# Patient Record
Sex: MALE | URBAN - METROPOLITAN AREA
[De-identification: names, ages, dates, MRNs, and addresses within clinical notes are randomized per-mention and may not be internally consistent; named-entity substitution may affect disease eponyms.]

---

## 2017-08-16 ENCOUNTER — OFFICE VISIT (OUTPATIENT)
Dept: OPHTHALMOLOGY | Facility: CLINIC | Age: 57
End: 2017-08-16
Attending: OPHTHALMOLOGY
Payer: MEDICARE

## 2017-08-16 DIAGNOSIS — H35.52 RP (RETINITIS PIGMENTOSA): Primary | ICD-10-CM

## 2017-08-16 ASSESSMENT — SLIT LAMP EXAM - LIDS
COMMENTS: NORMAL
COMMENTS: NORMAL

## 2017-08-16 ASSESSMENT — VISUAL ACUITY
METHOD: SNELLEN - LINEAR
OD_SC: LP
OS_SC: LP

## 2017-08-16 ASSESSMENT — EXTERNAL EXAM - RIGHT EYE: OD_EXAM: NORMAL

## 2017-08-16 ASSESSMENT — TONOMETRY
OD_IOP_MMHG: 23
IOP_METHOD: TONOPEN
OS_IOP_MMHG: 19

## 2017-08-16 ASSESSMENT — EXTERNAL EXAM - LEFT EYE: OS_EXAM: NORMAL

## 2017-08-16 NOTE — LETTER
"8/16/2017       RE: Froylan Edwards  [address]   [facility] [address]     Dear Colleague,    Thank you for referring your patient, Froylan Edwards, to the EYE CLINIC at [facility]. Please see a copy of my visit note below.    CC -    ARGUS patient OS / RP  HPI -  No changes since ZOHRA.  Feels device helps a little, with reading & with navigation, but not enough to use routinely with ADLs.  Occasional discomfort, \"zaps\", not requiring meds.   Sees floater OD, moves around, bothersome. left eye stable .     POH  ARGUS II OS 4/28/15  Anterior chamber & post segment washout 5/715 for NCVH and hyphema OS   Status post  YAG attempt at vitreolysis right eye  8/2015    Gtts: Genteal gel (occasional)    RETINAL IMAGING  Optical Coherence Tomography 2.10.16  left eye: implant in good position, outer retina atrophic changes stable    ASSESSMENT & PLAN  1. ARGUS II OS    -  Pars plana vitrectomy (PPV)/Argus II 4/28/15   -  Anterior chamber & post segment washout/PPV left eye 5/7/15     -  retina attached, no infection, intraocular pressure good today.    - external components look good and no exposure   - implant in good position   - observe   - recheck 6 months    2.  History of chronic Pain left eye   - much better now    3. ERROL OS   - recommended using genteal 2-3/day to prevent erosion of conjunctiva    4. H/o VH OS   - resolved    4.  Advanced Retinitis pigmentosa    5.  Pseudophakia both eyes     6.  Floater OD   - given LP vision, surprising that it would be symptomatic   - patient's description is consistent with exam however   - failed attempted lysis 8/12/15    Chris Hernández MD  PGY3, Dept of Ophthalmology  Pager [phone]    ~~~~~~~~~~~~~~~~~~~~~~~~~~~~~~~~~~   Complete documentation of historical and exam elements from today's encounter can be found in the full encounter summary report (not reduplicated in this progress note).  I personally obtained the chief " complaint(s) and history of present illness.  I confirmed and edited as necessary the review of systems, past medical/surgical history, family history, social history, and examination findings as documented by others; and I examined the patient myself.  I personally reviewed the relevant tests, images, and reports as documented above.  I formulated and edited as necessary the assessment and plan and discussed the findings and management plan with the patient and family    Magdalene Adkins MD  .  Retina Service   Department of Ophthalmology and Visual Neurosciences   [facility]  Phone: [phone]   Fax: [phone]

## 2017-08-16 NOTE — MR AVS SNAPSHOT
After Visit Summary   2017    Froylan Edwards    MRN: [id number]           Patient Information     Date Of Birth          1960        Visit Information        Provider Department      2017 2:30 PM Magdalene Adkins MD Eye Clinic        Today's Diagnoses     RP (retinitis pigmentosa) - Both Eyes    -  1       Follow-ups after your visit        Who to contact     Please call your clinic at [phone] to:    Ask questions about your health    Make or cancel appointments    Discuss your medicines    Learn about your test results    Speak to your doctor   If you have compliments or concerns about an experience at your clinic, or if you wish to file a complaint, please contact [facility] Patient Relations at [phone] or email us at [email or web address]         Additional Information About Your Visit        MyChart Information     Anonymesst is an electronic gateway that provides easy, online access to your medical records. With [employer], you can request a clinic appointment, read your test results, renew a prescription or communicate with your care team.     To sign up for Anonymesst visit the website at [email or web address]   You will be asked to enter the access code listed below, as well as some personal information. Please follow the directions to create your username and password.     Your access code is: [email or web address]  Expires: 10/31/2017  6:31 AM     Your access code will  in 90 days. If you need help or a new code, please contact your [facility] or call [phone] for assistance.        Care EveryWhere ID     This is your Care EveryWhere ID. This could be used by other organizations to access your [place] medical records  [id number]         Blood Pressure from Last 3 Encounters:   08/12/15 152/60   05/07/15 123/77   05/01/15 142/87    Weight from Last 3 Encounters:   08/12/15 86.2 kg (190 lb)    04/28/15 95.3 kg (210 lb)              Today, you had the following     No orders found for display       Primary Care Provider    Physician No Ref-Primary       No address on file        Equal Access to Services     BULMARO ELIEUSEBIO : Hadii anshul manzano rama Valle, berna [place], woody kabenedicto mooney, [place]. So [facility] [phone].    ATENCIÓN: Si habla español, tiene a ambrocio disposición servicios gratuitos de asistencia lingüística. Llame al [phone].    We comply with applicable federal civil rights laws and [place] laws. We do not discriminate on the basis of race, color, national origin, age, disability sex, sexual orientation or gender identity.            Thank you!     Thank you for choosing EYE CLINIC  for your care. Our goal is always to provide you with excellent care. Hearing back from our patients is one way we can continue to improve our services. Please take a few minutes to complete the written survey that you may receive in the mail after your visit with us. Thank you!             Your Updated Medication List - Protect others around you: Learn how to safely use, store and throw away your medicines at www.disposemymeds.org.          This list is accurate as of: 8/16/17  5:49 PM.  Always use your most recent med list.                   Brand Name Dispense Instructions for use Diagnosis    aspirin 81 MG tablet      Take by mouth daily        atorvastatin 40 MG tablet    LIPITOR     Take 40 mg by mouth daily        BENTYL PO      Take 10 mg by mouth 4 times daily (before meals and nightly)        buPROPion 150 MG 24 hr tablet    WELLBUTRIN XL     Take 150 mg by mouth every morning        carboxymethylcellulose sodium 0.5 % Soln ophthalmic solution   Generic drug:  carboxymethylcellulose      1 drop as needed Rarely uses        clonazePAM 1 MG tablet    klonoPIN     Take 1 mg by mouth 2 times daily as needed        clopidogrel 75 MG tablet    PLAVIX     Take by  mouth daily        glucagon 1 MG kit      1 mg as needed for low blood sugar        * HYDROcodone-acetaminophen 5-325 MG per tablet    NORCO    20 tablet    Take 1 tablet by mouth every 8 hours as needed for moderate to severe pain    Aftercare following surgery of the sense organs, NEC       * HYDROcodone-acetaminophen 5-325 MG per tablet    NORCO    20 tablet    Take 1 tablet by mouth every 6 hours as needed for moderate to severe pain (Use one or two tablets as needed every 6 hour for pain)    Aftercare following surgery of the sense organs, NEC       * HYDROcodone-acetaminophen 5-325 MG per tablet    NORCO    20 tablet    Take 2 tablets by mouth every 6 hours as needed for moderate to severe pain (Use one or two tablets as needed every 4 hour for pain)    Superficial injury of cornea, right, sequela       LANTUS SC      Inject 80 Units Subcutaneous At Bedtime        levofloxacin 500 MG tablet    LEVAQUIN    4 tablet    Take 1 tablet (500 mg) by mouth daily    Pigmentary retinal dystrophy       losartan 100 MG tablet    COZAAR     Take 0.5 mg by mouth daily        melatonin 3 MG tablet      Take 2 tablets by mouth nightly as needed        metFORMIN 500 MG tablet    GLUCOPHAGE     Take 500 mg by mouth daily (with breakfast)        metoprolol 50 MG tablet    LOPRESSOR     Take 50 mg by mouth 2 times daily        Multi-vitamin Tabs tablet      Take 1 tablet by mouth daily        * NOVOLOG SC      Sliding scale        * NovoLOG FLEXPEN 100 UNIT/ML injection   Generic drug:  insulin aspart      Inject 21 Units Subcutaneous 3 times daily (with meals)        * NovoLOG FLEXPEN 100 UNIT/ML injection   Generic drug:  insulin aspart      Inject 10-12 Units Subcutaneous Take with snacks or supplements for high blood sugar        * oxyCODONE-acetaminophen 5-325 MG per tablet    PERCOCET    60 tablet    Take 1-2 tablets by mouth every 4 hours as needed for moderate to severe pain    Aftercare following surgery of the sense  organs, NEC       * oxyCODONE-acetaminophen 5-325 MG per tablet    PERCOCET    60 tablet    Take 1-2 tablets by mouth every 6 hours as needed for moderate to severe pain or pain    Aftercare following surgery of the sense organs, NEC       predniSONE 5 MG tablet    DELTASONE    15 tablet    Take 1 tablet (5 mg) by mouth daily    Aftercare following surgery of the sense organs, NEC       QUEtiapine 25 MG tablet    SEROquel    20 tablet    Take 0.5-1 tablets (12.5-25 mg) by mouth nightly as needed (for anxiety or sleeplessness)    Post-op pain       REGLAN PO      Take 5 mg by mouth        senna-docusate 8.6-50 MG per tablet    SENOKOT-S;PERICOLACE    30 tablet    Take 2 tablets by mouth 2 times daily    Post-op pain       testosterone cypionate 200 MG/ML injection    DEPOTESTOTERONE     Inject 50 mg into the muscle once a week        VITAMIN D3 PO      Take 8,000 Int'l Units by mouth daily        ZANAFLEX PO      Take 2 mg by mouth every 6 hours as needed for muscle spasms        * Notice:  This list has 8 medication(s) that are the same as other medications prescribed for you. Read the directions carefully, and ask your doctor or other care provider to review them with you.

## 2017-08-16 NOTE — PROGRESS NOTES
"CC -    ARGUS patient OS / RP  HPI -  No changes since ZOHRA.  Feels device helps a little, with reading & with navigation, but not enough to use routinely with ADLs.  Occasional discomfort, \"zaps\", not requiring meds.   Sees floater OD, moves around, bothersome. left eye stable .     POH  ARGUS II OS 4/28/15  Anterior chamber & post segment washout 5/715 for NCVH and hyphema OS   Status post  YAG attempt at vitreolysis right eye  8/2015    Gtts: Genteal gel (occasional)    RETINAL IMAGING  Optical Coherence Tomography 2.10.16  left eye: implant in good position, outer retina atrophic changes stable    ASSESSMENT & PLAN  1. ARGUS II OS    -  Pars plana vitrectomy (PPV)/Argus II 4/28/15   -  Anterior chamber & post segment washout/PPV left eye 5/7/15     -  retina attached, no infection, intraocular pressure good today.    - external components look good and no exposure   - implant in good position   - observe   - recheck 6 months    2.  History of chronic Pain left eye   - much better now    3. ERROL OS   - recommended using genteal 2-3/day to prevent erosion of conjunctiva    4. H/o VH OS   - resolved    4.  Advanced Retinitis pigmentosa    5.  Pseudophakia both eyes     6.  Floater OD   - given LP vision, surprising that it would be symptomatic   - patient's description is consistent with exam however   - failed attempted lysis 8/12/15    Chris Hernández MD  PGY3, Dept of Ophthalmology  Pager [phone]    ~~~~~~~~~~~~~~~~~~~~~~~~~~~~~~~~~~   Complete documentation of historical and exam elements from today's encounter can be found in the full encounter summary report (not reduplicated in this progress note).  I personally obtained the chief complaint(s) and history of present illness.  I confirmed and edited as necessary the review of systems, past medical/surgical history, family history, social history, and examination findings as documented by others; and I examined the patient myself.  I personally reviewed " the relevant tests, images, and reports as documented above.  I formulated and edited as necessary the assessment and plan and discussed the findings and management plan with the patient and family    Magdalene Adkins MD  .  Retina Service   Department of Ophthalmology and Visual Neurosciences   [facility]  Phone: [phone]   Fax: [phone]

## 2018-04-12 ENCOUNTER — OFFICE VISIT (OUTPATIENT)
Dept: OPHTHALMOLOGY | Facility: CLINIC | Age: 58
End: 2018-04-12
Attending: OPHTHALMOLOGY
Payer: MEDICARE

## 2018-04-12 DIAGNOSIS — Z48.810 AFTERCARE FOLLOWING SURGERY OF A SENSORY ORGAN: Primary | ICD-10-CM

## 2018-04-12 PROCEDURE — 40000269 ZZH STATISTIC NO CHARGE FACILITY FEE: Mod: ZF

## 2018-04-12 ASSESSMENT — VISUAL ACUITY
OS_SC: LP
OD_SC: LP
METHOD: SNELLEN - LINEAR

## 2018-04-12 ASSESSMENT — TONOMETRY
IOP_METHOD: PALPATION
OS_IOP_MMHG: 8
OD_IOP_MMHG: 13

## 2018-04-12 NOTE — NURSING NOTE
"Chief Complaints and History of Present Illnesses   Patient presents with     Research Study     ARGUS patient OS / RP (4/28/15)     HPI    Symptoms:     No blurred vision   No decreased vision   Floaters   Dryness         Do you have eye pain now?:  No      Comments:  ARGUS patient OS / RP (4/28/15)    No changes since LV.  Device still helps a little, with reading & with navigation, but not enough to use routinely with ADLs. No longer has the occasional discomfort 'zaps,' unsure when they had stopped but it was after LV.    Would like an update on the gls, was told there would be a wait b/c they are trying to get them approved through the FAA but it has been 10mo      [1] Floater OD, stable since LV  Had blood lab work done at , Vit D was low (15), hem sex hormone (11),   ERROL, slightly managed with genteal skyler PRN\"    Ocular meds:  Genteal skyler prn    Rosanna Rodas COT 8:38 AM April 12, 2018                  "

## 2018-04-12 NOTE — MR AVS SNAPSHOT
After Visit Summary   2018    Froylan Edwards    MRN: [id number]           Patient Information     Date Of Birth          1960        Visit Information        Provider Department      2018 8:00 AM [facility] EYE PHOTOGRAPHY Eye Clinic        Today's Diagnoses     Aftercare following surgery of a sensory organ    -  1       Follow-ups after your visit        Who to contact     Please call your clinic at [phone] to:    Ask questions about your health    Make or cancel appointments    Discuss your medicines    Learn about your test results    Speak to your doctor            Additional Information About Your Visit        MyChart Information     Kineta is an electronic gateway that provides easy, online access to your medical records. With Kineta, you can request a clinic appointment, read your test results, renew a prescription or communicate with your care team.     To sign up for [employer] visit the website at [email or web address]   You will be asked to enter the access code listed below, as well as some personal information. Please follow the directions to create your username and password.     Your access code is: [email or web address]  Expires: 2018 11:39 AM     Your access code will  in 90 days. If you need help or a new code, please contact your [facility] or call [phone] for assistance.        Care EveryWhere ID     This is your Care EveryWhere ID. This could be used by other organizations to access your [place] medical records  [id number]         Blood Pressure from Last 3 Encounters:   08/12/15 152/60   05/07/15 123/77   05/01/15 142/87    Weight from Last 3 Encounters:   08/12/15 86.2 kg (190 lb)   04/28/15 95.3 kg (210 lb)              Today, you had the following     No orders found for display       Primary Care Provider Fax #    Physician No Ref-Primary [phone]       No address on file        Equal Access to Services      BULMARO CLAIRE : Hadii aad ku rama Valle, waaxda luqadaha, qaybta kaalmada jesica, colt liliya haybenny beardelykelly dickson . So [facility] [phone].    ATENCIÓN: Si habla español, tiene a ambrocio disposición servicios gratuitos de asistencia lingüística. Llame al [phone].    We comply with applicable federal civil rights laws and [place] laws. We do not discriminate on the basis of race, color, national origin, age, disability, sex, sexual orientation, or gender identity.            Thank you!     Thank you for choosing EYE CLINIC  for your care. Our goal is always to provide you with excellent care. Hearing back from our patients is one way we can continue to improve our services. Please take a few minutes to complete the written survey that you may receive in the mail after your visit with us. Thank you!             Your Updated Medication List - Protect others around you: Learn how to safely use, store and throw away your medicines at www.disposemymeds.org.          This list is accurate as of 4/12/18 11:59 PM.  Always use your most recent med list.                   Brand Name Dispense Instructions for use Diagnosis    aspirin 81 MG tablet      Take by mouth daily        atorvastatin 40 MG tablet    LIPITOR     Take 40 mg by mouth daily        buPROPion 150 MG 24 hr tablet    WELLBUTRIN XL     Take 150 mg by mouth every morning        carboxymethylcellulose sodium 0.5 % Soln ophthalmic solution   Generic drug:  carboxymethylcellulose      1 drop as needed Rarely uses        clonazePAM 1 MG tablet    klonoPIN     Take 1 mg by mouth 2 times daily as needed        clopidogrel 75 MG tablet    PLAVIX     Take by mouth daily        glucagon 1 MG kit      1 mg as needed for low blood sugar        LANTUS SC      Inject 80 Units Subcutaneous At Bedtime        losartan 100 MG tablet    COZAAR     Take 0.5 mg by mouth daily        melatonin 3 MG tablet      Take 2 tablets by mouth nightly as needed        metFORMIN  500 MG tablet    GLUCOPHAGE     Take 500 mg by mouth daily (with breakfast)        metoprolol tartrate 50 MG tablet    LOPRESSOR     Take 50 mg by mouth 2 times daily        Multi-vitamin Tabs tablet      Take 1 tablet by mouth daily        * NOVOLOG SC      Sliding scale        * NovoLOG FLEXPEN 100 UNIT/ML injection   Generic drug:  insulin aspart      Inject 10-12 Units Subcutaneous Take with snacks or supplements for high blood sugar        QUEtiapine 25 MG tablet    SEROquel    20 tablet    Take 0.5-1 tablets (12.5-25 mg) by mouth nightly as needed (for anxiety or sleeplessness)    Post-op pain       testosterone cypionate 200 MG/ML injection    DEPOTESTOTERONE     Inject 50 mg into the muscle once a week        VITAMIN D3 PO      Take 8,000 Int'l Units by mouth daily        * Notice:  This list has 2 medication(s) that are the same as other medications prescribed for you. Read the directions carefully, and ask your doctor or other care provider to review them with you.

## 2018-09-05 ENCOUNTER — OFFICE VISIT (OUTPATIENT)
Dept: OPHTHALMOLOGY | Facility: CLINIC | Age: 58
End: 2018-09-05
Attending: OPHTHALMOLOGY
Payer: MEDICARE

## 2018-09-05 DIAGNOSIS — H35.52 RP (RETINITIS PIGMENTOSA): Primary | ICD-10-CM

## 2018-09-05 PROCEDURE — G0463 HOSPITAL OUTPT CLINIC VISIT: HCPCS | Mod: ZF

## 2018-09-05 PROCEDURE — 92134 CPTRZ OPH DX IMG PST SGM RTA: CPT | Mod: ZF | Performed by: OPHTHALMOLOGY

## 2018-09-05 PROCEDURE — 92250 FUNDUS PHOTOGRAPHY W/I&R: CPT | Mod: ZF | Performed by: OPHTHALMOLOGY

## 2018-09-05 ASSESSMENT — SLIT LAMP EXAM - LIDS
COMMENTS: NORMAL
COMMENTS: NORMAL

## 2018-09-05 ASSESSMENT — TONOMETRY
IOP_METHOD: ICARE
OD_IOP_MMHG: 10
OS_IOP_MMHG: 15

## 2018-09-05 ASSESSMENT — EXTERNAL EXAM - RIGHT EYE: OD_EXAM: NORMAL

## 2018-09-05 ASSESSMENT — EXTERNAL EXAM - LEFT EYE: OS_EXAM: NORMAL

## 2018-09-05 NOTE — PROGRESS NOTES
"CC -    ARGUS patient OS / RP  HPI -  No changes since ZOHRA.  Feels device helps a little, with reading & with navigation, but not enough to use routinely with ADLs.  Occasional discomfort, \"zaps\", not requiring meds.   Sees floater OD, moves around, bothersome. left eye stable .     POH  ARGUS II OS 4/28/15  Anterior chamber & post segment washout 5/715 for NCVH and hyphema OS   Status post  YAG attempt at vitreolysis right eye  8/2015    Gtts: Genteal gel (occasional)    RETINAL IMAGING  Optical Coherence Tomography 09/05/18   left eye: implant in good position, outer retina atrophic changes stable  Right eye: outer retina atrophic changes    Fundus pic consistent with exam 09/05/18     ASSESSMENT & PLAN  1. ARGUS II OS    -  Pars plana vitrectomy (PPV)/Argus II 4/28/15   -  Anterior chamber & post segment washout/PPV left eye 5/7/15   -  retina attached, no infection, intraocular pressure good.    - external components look good and no exposure   - implant in good position   - observe   - recheck 6 months  Patient using the device about 3 times per weeks for 2-3 hours    2.  History of chronic Pain left eye   - much better now    3. ERROL   - recommended using genteal 2-3/day     4.  Advanced Retinitis pigmentosa    5.  Pseudophakia both eyes     6.  Floater OD   - given his vision, surprising that it would be symptomatic   - patient's description is consistent with exam however   - failed attempted lysis 8/12/15   - patient reports it bothers him, states he sees the floater constantly and affects him the use of Argus II left ey,  thinking there is something there. Also bothers him even if he is not using the Argus II   - could consider vitrectomy 25 g Pars plana vitrectomy (PPV); AFx - peribulbar block 45 min surgery    ~~~~~~~~~~~~~~~~~~~~~~~~~~~~~~~~~~   Complete documentation of historical and exam elements from today's encounter can be found in the full encounter summary report (not reduplicated in this " progress note).  I personally obtained the chief complaint(s) and history of present illness.  I confirmed and edited as necessary the review of systems, past medical/surgical history, family history, social history, and examination findings as documented by others; and I examined the patient myself.  I personally reviewed the relevant tests, images, and reports as documented above.  I personally reviewed the ophthalmic test(s) associated with this encounter, agree with the interpretation(s) as documented by the resident/fellow, and have edited the corresponding report(s) as necessary.   I formulated and edited as necessary the assessment and plan and discussed the findings and management plan with the patient and family    Magdalene Adkins MD  .  Retina Service   Department of Ophthalmology and Visual Neurosciences   [facility]  Phone: [phone]   Fax: [phone]

## 2018-09-05 NOTE — MR AVS SNAPSHOT
After Visit Summary   2018    Froylan Edwards    MRN: [id number]           Patient Information     Date Of Birth          1960        Visit Information        Provider Department      2018 2:30 PM [facility]        Today's Diagnoses     RP (retinitis pigmentosa) - Both Eyes    -  1       Follow-ups after your visit        Follow-up notes from your care team     Return in about 6 months (around 3/5/2019).      Who to contact     Please call your clinic at [phone] to:    Ask questions about your health    Make or cancel appointments    Discuss your medicines    Learn about your test results    Speak to your doctor            Additional Information About Your Visit        MyChart Information     [employer] is an electronic gateway that provides easy, online access to your medical records. With [facility], you can request a clinic appointment, read your test results, renew a prescription or communicate with your care team.     To sign up for [employer] visit the website at [email or web address]   You will be asked to enter the access code listed below, as well as some personal information. Please follow the directions to create your username and password.     Your access code is: [email or web address]  Expires: 2018  6:48 PM     Your access code will  in 90 days. If you need help or a new code, please contact your [facility] or call [phone] for assistance.        Care EveryWhere ID     This is your Care EveryWhere ID. This could be used by other organizations to access your [facility] medical records  [id number]         Blood Pressure from Last 3 Encounters:   08/12/15 152/60   05/07/15 123/77   05/01/15 142/87    Weight from Last 3 Encounters:   08/12/15 86.2 kg (190 lb)   04/28/15 95.3 kg (210 lb)              We Performed the Following     Fundus Photos OU (both eyes)     OCT Retina Spectralis OU (both eyes)        Primary  Care Provider Fax #    Physician No Ref-Primary [phone]       No address on file        Equal Access to Services     BULMARO CLAIRE : Hadii anshul manzano rama Valle, wajoneda alban, woody kabenedicto mooney, colt gramajo laterriegrazyna hill. So [facility] [phone].    ATENCIÓN: Si habla español, tiene a ambrocio disposición servicios gratuitos de asistencia lingüística. Llame al [phone].    We comply with applicable federal civil rights laws and [place] laws. We do not discriminate on the basis of race, color, national origin, age, disability, sex, sexual orientation, or gender identity.            Thank you!     Thank you for choosing [facility]  for your care. Our goal is always to provide you with excellent care. Hearing back from our patients is one way we can continue to improve our services. Please take a few minutes to complete the written survey that you may receive in the mail after your visit with us. Thank you!             Your Updated Medication List - Protect others around you: Learn how to safely use, store and throw away your medicines at www.disposemymeds.org.          This list is accurate as of 9/5/18  6:48 PM.  Always use your most recent med list.                   Brand Name Dispense Instructions for use Diagnosis    aspirin 81 MG tablet      Take by mouth daily        atorvastatin 40 MG tablet    LIPITOR     Take 40 mg by mouth daily        buPROPion 150 MG 24 hr tablet    WELLBUTRIN XL     Take 150 mg by mouth every morning        carboxymethylcellulose sodium 0.5 % Soln ophthalmic solution   Generic drug:  carboxymethylcellulose      1 drop as needed Rarely uses        clonazePAM 1 MG tablet    klonoPIN     Take 1 mg by mouth 2 times daily as needed        clopidogrel 75 MG tablet    PLAVIX     Take by mouth daily        glucagon 1 MG kit      1 mg as needed for low blood sugar        LANTUS SC      Inject 80 Units Subcutaneous At Bedtime        losartan 100 MG tablet    COZAAR      Take 0.5 mg by mouth daily        melatonin 3 MG tablet      Take 2 tablets by mouth nightly as needed        metFORMIN 500 MG tablet    GLUCOPHAGE     Take 500 mg by mouth daily (with breakfast)        metoprolol tartrate 50 MG tablet    LOPRESSOR     Take 50 mg by mouth 2 times daily        Multi-vitamin Tabs tablet      Take 1 tablet by mouth daily        * NOVOLOG SC      Sliding scale        * NovoLOG FLEXPEN 100 UNIT/ML injection   Generic drug:  insulin aspart      Inject 10-12 Units Subcutaneous Take with snacks or supplements for high blood sugar        QUEtiapine 25 MG tablet    SEROquel    20 tablet    Take 0.5-1 tablets (12.5-25 mg) by mouth nightly as needed (for anxiety or sleeplessness)    Post-op pain       testosterone cypionate 200 MG/ML injection    DEPOTESTOTERONE     Inject 50 mg into the muscle once a week        VITAMIN D3 PO      Take 8,000 Int'l Units by mouth daily        * Notice:  This list has 2 medication(s) that are the same as other medications prescribed for you. Read the directions carefully, and ask your doctor or other care provider to review them with you.

## 2018-09-06 DIAGNOSIS — H43.391 VITREOUS FLOATERS OF RIGHT EYE: Primary | ICD-10-CM

## 2018-09-17 ENCOUNTER — ANESTHESIA EVENT (OUTPATIENT)
Dept: SURGERY | Facility: AMBULATORY SURGERY CENTER | Age: 58
End: 2018-09-17

## 2018-09-17 ENCOUNTER — TRANSFERRED RECORDS (OUTPATIENT)
Dept: HEALTH INFORMATION MANAGEMENT | Facility: CLINIC | Age: 58
End: 2018-09-17

## 2018-09-18 ENCOUNTER — SURGERY (OUTPATIENT)
Age: 58
End: 2018-09-18

## 2018-09-18 ENCOUNTER — HOSPITAL ENCOUNTER (OUTPATIENT)
Facility: AMBULATORY SURGERY CENTER | Age: 58
End: 2018-09-18
Attending: OPHTHALMOLOGY
Payer: MEDICARE

## 2018-09-18 ENCOUNTER — ANESTHESIA (OUTPATIENT)
Dept: SURGERY | Facility: AMBULATORY SURGERY CENTER | Age: 58
End: 2018-09-18

## 2018-09-18 VITALS
OXYGEN SATURATION: 96 % | DIASTOLIC BLOOD PRESSURE: 81 MMHG | TEMPERATURE: 97.3 F | SYSTOLIC BLOOD PRESSURE: 133 MMHG | RESPIRATION RATE: 16 BRPM

## 2018-09-18 DIAGNOSIS — G89.18 POST-OP PAIN: Primary | ICD-10-CM

## 2018-09-18 LAB
GLUCOSE BLDC GLUCOMTR-MCNC: 251 MG/DL (ref 70–99)
GLUCOSE BLDC GLUCOMTR-MCNC: 267 MG/DL (ref 70–99)

## 2018-09-18 RX ORDER — TROPICAMIDE 10 MG/ML
1 SOLUTION/ DROPS OPHTHALMIC
Status: COMPLETED | OUTPATIENT
Start: 2018-09-18 | End: 2018-09-18

## 2018-09-18 RX ORDER — ACETAMINOPHEN 325 MG/1
975 TABLET ORAL ONCE
Status: COMPLETED | OUTPATIENT
Start: 2018-09-18 | End: 2018-09-18

## 2018-09-18 RX ORDER — ONDANSETRON 4 MG/1
4 TABLET, ORALLY DISINTEGRATING ORAL EVERY 30 MIN PRN
Status: DISCONTINUED | OUTPATIENT
Start: 2018-09-18 | End: 2018-09-20 | Stop reason: HOSPADM

## 2018-09-18 RX ORDER — LIDOCAINE HYDROCHLORIDE 20 MG/ML
INJECTION, SOLUTION INFILTRATION; PERINEURAL PRN
Status: DISCONTINUED | OUTPATIENT
Start: 2018-09-18 | End: 2018-09-18

## 2018-09-18 RX ORDER — DICYCLOMINE HYDROCHLORIDE 10 MG/1
CAPSULE ORAL
COMMUNITY
Start: 2018-01-31

## 2018-09-18 RX ORDER — FENTANYL CITRATE 50 UG/ML
25-50 INJECTION, SOLUTION INTRAMUSCULAR; INTRAVENOUS
Status: DISCONTINUED | OUTPATIENT
Start: 2018-09-18 | End: 2018-09-18 | Stop reason: HOSPADM

## 2018-09-18 RX ORDER — CLONAZEPAM 0.5 MG/1
0.5 TABLET ORAL
COMMUNITY
End: 2018-10-24

## 2018-09-18 RX ORDER — HYDROMORPHONE HYDROCHLORIDE 1 MG/ML
.3-.5 INJECTION, SOLUTION INTRAMUSCULAR; INTRAVENOUS; SUBCUTANEOUS EVERY 10 MIN PRN
Status: DISCONTINUED | OUTPATIENT
Start: 2018-09-18 | End: 2018-09-20 | Stop reason: HOSPADM

## 2018-09-18 RX ORDER — ONDANSETRON 2 MG/ML
INJECTION INTRAMUSCULAR; INTRAVENOUS PRN
Status: DISCONTINUED | OUTPATIENT
Start: 2018-09-18 | End: 2018-09-18

## 2018-09-18 RX ORDER — FENTANYL CITRATE 50 UG/ML
INJECTION, SOLUTION INTRAMUSCULAR; INTRAVENOUS PRN
Status: DISCONTINUED | OUTPATIENT
Start: 2018-09-18 | End: 2018-09-18

## 2018-09-18 RX ORDER — SODIUM CHLORIDE, SODIUM LACTATE, POTASSIUM CHLORIDE, CALCIUM CHLORIDE 600; 310; 30; 20 MG/100ML; MG/100ML; MG/100ML; MG/100ML
INJECTION, SOLUTION INTRAVENOUS CONTINUOUS
Status: DISCONTINUED | OUTPATIENT
Start: 2018-09-18 | End: 2018-09-18 | Stop reason: HOSPADM

## 2018-09-18 RX ORDER — OXYCODONE HYDROCHLORIDE 5 MG/1
5-10 TABLET ORAL EVERY 4 HOURS PRN
Status: DISCONTINUED | OUTPATIENT
Start: 2018-09-18 | End: 2018-09-20 | Stop reason: HOSPADM

## 2018-09-18 RX ORDER — ALBUTEROL SULFATE 0.83 MG/ML
2.5 SOLUTION RESPIRATORY (INHALATION) EVERY 4 HOURS PRN
Status: DISCONTINUED | OUTPATIENT
Start: 2018-09-18 | End: 2018-09-18 | Stop reason: HOSPADM

## 2018-09-18 RX ORDER — LEVOCETIRIZINE DIHYDROCHLORIDE 5 MG/1
5 TABLET, FILM COATED ORAL
COMMUNITY
Start: 2018-06-11

## 2018-09-18 RX ORDER — GABAPENTIN 300 MG/1
300 CAPSULE ORAL ONCE
Status: COMPLETED | OUTPATIENT
Start: 2018-09-18 | End: 2018-09-18

## 2018-09-18 RX ORDER — BALANCED SALT SOLUTION 6.4; .75; .48; .3; 3.9; 1.7 MG/ML; MG/ML; MG/ML; MG/ML; MG/ML; MG/ML
SOLUTION OPHTHALMIC PRN
Status: DISCONTINUED | OUTPATIENT
Start: 2018-09-18 | End: 2018-09-18 | Stop reason: HOSPADM

## 2018-09-18 RX ORDER — TRAZODONE HYDROCHLORIDE 50 MG/1
TABLET, FILM COATED ORAL
COMMUNITY
Start: 2018-05-31

## 2018-09-18 RX ORDER — PHENYLEPHRINE HYDROCHLORIDE 25 MG/ML
1 SOLUTION/ DROPS OPHTHALMIC
Status: COMPLETED | OUTPATIENT
Start: 2018-09-18 | End: 2018-09-18

## 2018-09-18 RX ORDER — KETOROLAC TROMETHAMINE 30 MG/ML
30 INJECTION, SOLUTION INTRAMUSCULAR; INTRAVENOUS EVERY 6 HOURS PRN
Status: DISCONTINUED | OUTPATIENT
Start: 2018-09-18 | End: 2018-09-20 | Stop reason: HOSPADM

## 2018-09-18 RX ORDER — NEOMYCIN POLYMYXIN B SULFATES AND DEXAMETHASONE 3.5; 10000; 1 MG/ML; [USP'U]/ML; MG/ML
1 SUSPENSION/ DROPS OPHTHALMIC 4 TIMES DAILY
Qty: 5 ML | Refills: 0 | Status: SHIPPED | OUTPATIENT
Start: 2018-09-18 | End: 2018-10-24

## 2018-09-18 RX ORDER — FENTANYL CITRATE 50 UG/ML
25-50 INJECTION, SOLUTION INTRAMUSCULAR; INTRAVENOUS
Status: DISCONTINUED | OUTPATIENT
Start: 2018-09-18 | End: 2018-09-20 | Stop reason: HOSPADM

## 2018-09-18 RX ORDER — PROPOFOL 10 MG/ML
INJECTION, EMULSION INTRAVENOUS PRN
Status: DISCONTINUED | OUTPATIENT
Start: 2018-09-18 | End: 2018-09-18

## 2018-09-18 RX ORDER — ATROPINE SULFATE 10 MG/ML
SOLUTION/ DROPS OPHTHALMIC PRN
Status: DISCONTINUED | OUTPATIENT
Start: 2018-09-18 | End: 2018-09-18 | Stop reason: HOSPADM

## 2018-09-18 RX ORDER — CYCLOPENTOLATE HYDROCHLORIDE 10 MG/ML
1 SOLUTION/ DROPS OPHTHALMIC
Status: COMPLETED | OUTPATIENT
Start: 2018-09-18 | End: 2018-09-18

## 2018-09-18 RX ORDER — DEXAMETHASONE SODIUM PHOSPHATE 4 MG/ML
INJECTION, SOLUTION INTRA-ARTICULAR; INTRALESIONAL; INTRAMUSCULAR; INTRAVENOUS; SOFT TISSUE PRN
Status: DISCONTINUED | OUTPATIENT
Start: 2018-09-18 | End: 2018-09-18 | Stop reason: HOSPADM

## 2018-09-18 RX ORDER — SODIUM CHLORIDE, SODIUM LACTATE, POTASSIUM CHLORIDE, CALCIUM CHLORIDE 600; 310; 30; 20 MG/100ML; MG/100ML; MG/100ML; MG/100ML
INJECTION, SOLUTION INTRAVENOUS CONTINUOUS
Status: DISCONTINUED | OUTPATIENT
Start: 2018-09-18 | End: 2018-09-20 | Stop reason: HOSPADM

## 2018-09-18 RX ORDER — LIDOCAINE 40 MG/G
CREAM TOPICAL
Status: DISCONTINUED | OUTPATIENT
Start: 2018-09-18 | End: 2018-09-18 | Stop reason: HOSPADM

## 2018-09-18 RX ORDER — BUSPIRONE HYDROCHLORIDE 10 MG/1
10 TABLET ORAL
COMMUNITY
Start: 2018-09-17

## 2018-09-18 RX ORDER — DEXAMETHASONE SODIUM PHOSPHATE 4 MG/ML
4 INJECTION, SOLUTION INTRA-ARTICULAR; INTRALESIONAL; INTRAMUSCULAR; INTRAVENOUS; SOFT TISSUE EVERY 10 MIN PRN
Status: DISCONTINUED | OUTPATIENT
Start: 2018-09-18 | End: 2018-09-20 | Stop reason: HOSPADM

## 2018-09-18 RX ORDER — EZETIMIBE 10 MG/1
10 TABLET ORAL
COMMUNITY
Start: 2018-09-17 | End: 2019-09-17

## 2018-09-18 RX ORDER — ONDANSETRON 2 MG/ML
4 INJECTION INTRAMUSCULAR; INTRAVENOUS EVERY 30 MIN PRN
Status: DISCONTINUED | OUTPATIENT
Start: 2018-09-18 | End: 2018-09-20 | Stop reason: HOSPADM

## 2018-09-18 RX ORDER — MEPERIDINE HYDROCHLORIDE 25 MG/ML
12.5 INJECTION INTRAMUSCULAR; INTRAVENOUS; SUBCUTANEOUS
Status: DISCONTINUED | OUTPATIENT
Start: 2018-09-18 | End: 2018-09-20 | Stop reason: HOSPADM

## 2018-09-18 RX ORDER — NALOXONE HYDROCHLORIDE 0.4 MG/ML
.1-.4 INJECTION, SOLUTION INTRAMUSCULAR; INTRAVENOUS; SUBCUTANEOUS
Status: ACTIVE | OUTPATIENT
Start: 2018-09-18 | End: 2018-09-19

## 2018-09-18 RX ADMIN — FENTANYL CITRATE 50 MCG: 50 INJECTION, SOLUTION INTRAMUSCULAR; INTRAVENOUS at 11:07

## 2018-09-18 RX ADMIN — GABAPENTIN 300 MG: 300 CAPSULE ORAL at 10:14

## 2018-09-18 RX ADMIN — PROPOFOL 100 MG: 10 INJECTION, EMULSION INTRAVENOUS at 11:09

## 2018-09-18 RX ADMIN — ACETAMINOPHEN 975 MG: 325 TABLET ORAL at 10:14

## 2018-09-18 RX ADMIN — CYCLOPENTOLATE HYDROCHLORIDE 1 DROP: 10 SOLUTION/ DROPS OPHTHALMIC at 10:12

## 2018-09-18 RX ADMIN — PHENYLEPHRINE HYDROCHLORIDE 1 DROP: 25 SOLUTION/ DROPS OPHTHALMIC at 10:12

## 2018-09-18 RX ADMIN — ONDANSETRON 4 MG: 2 INJECTION INTRAMUSCULAR; INTRAVENOUS at 11:09

## 2018-09-18 RX ADMIN — Medication 0.5 ML: at 11:29

## 2018-09-18 RX ADMIN — TROPICAMIDE 1 DROP: 10 SOLUTION/ DROPS OPHTHALMIC at 09:57

## 2018-09-18 RX ADMIN — PHENYLEPHRINE HYDROCHLORIDE 1 DROP: 25 SOLUTION/ DROPS OPHTHALMIC at 09:57

## 2018-09-18 RX ADMIN — SODIUM CHLORIDE, SODIUM LACTATE, POTASSIUM CHLORIDE, CALCIUM CHLORIDE: 600; 310; 30; 20 INJECTION, SOLUTION INTRAVENOUS at 10:15

## 2018-09-18 RX ADMIN — DEXAMETHASONE SODIUM PHOSPHATE 4 MG: 4 INJECTION, SOLUTION INTRA-ARTICULAR; INTRALESIONAL; INTRAMUSCULAR; INTRAVENOUS; SOFT TISSUE at 11:07

## 2018-09-18 RX ADMIN — Medication 500 ML: at 11:25

## 2018-09-18 RX ADMIN — Medication 0.5 ML: at 11:27

## 2018-09-18 RX ADMIN — DEXAMETHASONE SODIUM PHOSPHATE 0.5 ML: 4 INJECTION, SOLUTION INTRA-ARTICULAR; INTRALESIONAL; INTRAMUSCULAR; INTRAVENOUS; SOFT TISSUE at 11:27

## 2018-09-18 RX ADMIN — LIDOCAINE HYDROCHLORIDE 60 MG: 20 INJECTION, SOLUTION INFILTRATION; PERINEURAL at 11:08

## 2018-09-18 RX ADMIN — PHENYLEPHRINE HYDROCHLORIDE 1 DROP: 25 SOLUTION/ DROPS OPHTHALMIC at 10:07

## 2018-09-18 RX ADMIN — ATROPINE SULFATE 1 DROP: 10 SOLUTION/ DROPS OPHTHALMIC at 11:27

## 2018-09-18 RX ADMIN — CYCLOPENTOLATE HYDROCHLORIDE 1 DROP: 10 SOLUTION/ DROPS OPHTHALMIC at 10:07

## 2018-09-18 RX ADMIN — TROPICAMIDE 1 DROP: 10 SOLUTION/ DROPS OPHTHALMIC at 10:07

## 2018-09-18 RX ADMIN — TROPICAMIDE 1 DROP: 10 SOLUTION/ DROPS OPHTHALMIC at 10:12

## 2018-09-18 RX ADMIN — FENTANYL CITRATE 50 MCG: 50 INJECTION, SOLUTION INTRAMUSCULAR; INTRAVENOUS at 11:17

## 2018-09-18 RX ADMIN — BALANCED SALT SOLUTION 15 ML: 6.4; .75; .48; .3; 3.9; 1.7 SOLUTION OPHTHALMIC at 11:25

## 2018-09-18 RX ADMIN — CYCLOPENTOLATE HYDROCHLORIDE 1 DROP: 10 SOLUTION/ DROPS OPHTHALMIC at 09:57

## 2018-09-18 ASSESSMENT — LIFESTYLE VARIABLES: TOBACCO_USE: 1

## 2018-09-18 NOTE — ANESTHESIA POSTPROCEDURE EVALUATION
Patient: Froylan Edwards    Procedure(s):  Right Eye 25 Vitrectomy, Fluid Gas Exchange - Wound Class: I-Clean    Diagnosis:Vitreous Floaters  Diagnosis Additional Information: No value filed.    Anesthesia Type:  MAC    Note:  Anesthesia Post Evaluation    Patient location during evaluation: PACU  Patient participation: Able to fully participate in evaluation  Level of consciousness: awake and alert  Pain management: adequate  Airway patency: patent  Cardiovascular status: acceptable  Respiratory status: acceptable  Hydration status: acceptable  PONV: none             Last vitals:  Vitals:    09/18/18 1016 09/18/18 1150   BP: (!) 146/94 133/81   Resp: 16 16   Temp: 36.7  C (98.1  F) 36.3  C (97.3  F)   SpO2: 97% 96%         Electronically Signed By: Eitan Cain MD  September 18, 2018  12:38 PM

## 2018-09-18 NOTE — ANESTHESIA PREPROCEDURE EVALUATION
Anesthesia Evaluation     . Pt has had prior anesthetic. Type: General    No history of anesthetic complications          ROS/MED HX    ENT/Pulmonary:     (+)sleep apnea, other ENT- TMD dysfunction, tobacco use, Past use , . .    Neurologic:     (+)other neuro bells palsy    Cardiovascular:     (+) hypertension--CAD, --. : . . . :. .       METS/Exercise Tolerance:     Hematologic:         Musculoskeletal:         GI/Hepatic:         Renal/Genitourinary:  - ROS Renal section negative       Endo:     (+) type I DM, .      Psychiatric:     (+) psychiatric history anxiety and depression      Infectious Disease:  - neg infectious disease ROS       Malignancy:      - no malignancy   Other:    (+) H/O Chronic Pain,                   Physical Exam  Normal systems: pulmonary    Airway   Mallampati: III  TM distance: >3 FB  Neck ROM: full    Dental   (+) missing and chipped    Cardiovascular   Rhythm and rate: regular and normal      Pulmonary                     Anesthesia Plan      History & Physical Review  History and physical reviewed and following examination; no interval change.    ASA Status:  3 .    NPO Status:  > 8 hours    Plan for MAC with Intravenous induction. Maintenance will be Balanced.    PONV prophylaxis:  Ondansetron (or other 5HT-3) and Dexamethasone or Solumedrol       Postoperative Care  Postoperative pain management:  Multi-modal analgesia.      Consents  Anesthetic plan, risks, benefits and alternatives discussed with:  Patient..                          .

## 2018-09-18 NOTE — ANESTHESIA CARE TRANSFER NOTE
Patient: Froylan Edwards    Procedure(s):  Right Eye 25 Vitrectomy, Fluid Gas Exchange - Wound Class: I-Clean    Diagnosis: Vitreous Floaters  Diagnosis Additional Information: No value filed.    Anesthesia Type:   MAC     Note:  Airway :Room Air  Patient transferred to:Phase II  Handoff Report: Identifed the Patient, Identified the Reponsible Provider, Reviewed the pertinent medical history, Discussed the surgical course, Reviewed Intra-OP anesthesia mangement and issues during anesthesia, Set expectations for post-procedure period and Allowed opportunity for questions and acknowledgement of understanding      Vitals: (Last set prior to Anesthesia Care Transfer)    CRNA VITALS  9/18/2018 1117 - 9/18/2018 1152      9/18/2018             Pulse: 90    SpO2: 96 %    Resp Rate (set): 10                Electronically Signed By: ADONAY Mcmahan CRNA  September 18, 2018  11:52 AM

## 2018-09-18 NOTE — IP AVS SNAPSHOT
[facility]    [address] [place] [id number]    Phone:  [phone]    Fax:  [phone]                                       After Visit Summary   9/18/2018    Froylan Edwards    MRN: [id number]           After Visit Summary Signature Page     I have received my discharge instructions, and my questions have been answered. I have discussed any challenges I see with this plan with the nurse or doctor.    ..........................................................................................................................................  Patient/Patient Representative Signature      ..........................................................................................................................................  Patient Representative Print Name and Relationship to Patient    ..................................................               ................................................  Date                                   Time    ..........................................................................................................................................  Reviewed by Signature/Title    ...................................................              ..............................................  Date                                               Time          22EPIC Rev 08/18

## 2018-09-18 NOTE — OP NOTE
Surgeon:    Magdalene Adkins M.D  Assistant surgeon:       Shawn Mcfarlane MD  Pre-operative diagnosis:  Vitreous floaters right eye  Post-operative diagnosis: Same  Surgery:     RIGHT 25 gauge vitectomy  , fluid-air exchange  Complications:   none  Anesthesia:    Monitored anesthesia care and peribulbar block right eye   Blood loss:    Scant  Complications :   None    INDICATIONS:   Froylan Edwards is a 58 year old patient with diagnosis of vitreous floaters right eye, here for surgical repair.     DESCRIPTION OF THE PROCEDURE   The patient was brought into the OR where anesthesia was given by the anesthesia department. A peribulbar block consistent of a 1:1 mixtures of 2% Lidocaine and 0.75 % of marcaine with epinephrine and wydase was administered. The eye was prepped and draped in the usual fashion for ophthalmic surgery, including the installation of one drop of povidone iodine.    Marks were made on the sclera inferotemporally, superotemporally, and superonasally 3.5 mm posterior to the limbus. Transscleral cannulas were inserted through the sclera using the trocars. The infusion cannula was connected inferotemporally and directly visualized to verify it was in the correct location. The vitrector handpiece and endoilluiminator were placed in the eye. Upon entering the eye it was noted that the patient had vitreous floaters.     A pars plana vitrectomy was completed. Kenalog was placed in the eye and peripheral vitrectomy was performed. The peripheral retina was examined with scleral depression no retinal breaks or tears were noted a partial air fluid exchange was performed. The instruments were removed. The sclerotomies were closed using 6-0 plain sutures.  The eye pressure was inspected and was appropriate. Subconjunctival injections of ancef and dexamethasone were administered.  The lid speculum was removed. The eye was cleaned with wet and dry gauze. A drop of atropine and maxitrol ointment was placed in  the eye. An eye patch and wall shield were taped over the eye.   The patient tolerated well the procedure and was discharge to the post-operative unit in stable conditions with no complications.    The surgery was assisted by Dr. Shawn Mcfarlane, because no qualified resident was available on the day of the surgery. Due to the delicate and complex nature of this surgery, Dr. Shawn Mcfarlane was required. Dr. Shawn Mcfarlane assisted with vitrectomy. I was present for the entire surgery.

## 2018-09-18 NOTE — DISCHARGE INSTRUCTIONS
[facility]  Home Care Following Anesthesia  For 24 hours after surgery:  1. Get plenty of rest.  A responsible adult must stay with you for at least 24 hours after you leave the surgery center.  2. Do not drive or use heavy equipment.  If you have weakness or tingling, don't drive or use heavy equipment until this feeling goes away.   3. Do not drink alcohol.   4. Avoid strenuous or risky activities.  Ask for help when climbing stairs.  5. You may feel lightheaded.  IF so, sit for a few minutes before standing.  Have someone help you get up.   6. If you have nausea (feel sick to your stomach): Drink only clear liquids such as apple juice, ginger ale, broth or 7-Up.  Rest may also help.  Be sure to drink enough fluids.  Move to a regular diet as you feel able.   7. You may have a slight fever.  Call the doctor if your fever is over 100 F (37.7 C) (taken under the tongue) or lasts longer than 24 hours.  8. You may have a dry mouth, a sore throat, muscle aches or trouble sleeping. These should go away after 24 hours.  9. Do not make important or legal decisions.               Tips for taking pain medications  To get the best pain relief possible, remember these points:    Take pain medications as directed, before pain becomes severe.    Pain medication can upset your stomach: taking it with food may help.    Constipation is a common side effect of pain medication. Drink plenty of  fluids.    Eat foods high in fiber. Take a stool softener if recommended by your doctor or pharmacist.    Do not drink alcohol, drive or operate machinery while taking pain medications.    Ask about other ways to control pain, such as with heat, ice or relaxation.    Tylenol/Acetaminophen Consumption  To help encourage the safe use of acetaminophen, the makers of TYLENOL  have lowered the maximum daily dose for single-ingredient Extra Strength TYLENOL  (acetaminophen) products sold in the [place] from 8  pills per day (4,000 mg) to 6 pills per day (3,000 mg). The dosing interval has also changed from 2 pills every 4-6 hours to 2 pills every 6 hours.    If you feel your pain relief is insufficient, you may take Tylenol/Acetaminophen in addition to your narcotic pain medication.     Be careful not to exceed 3,000 mg of Tylenol/Acetaminophen in a 24 hour period from all sources.    If you are taking extra strength Tylenol/acetaminophen (500 mg), the maximum dose is 6 tablets in 24 hours.    If you are taking regular strength acetaminophen (325 mg), the maximum dose is 9 tablets in 24 hours.    Call a doctor for any of the followin. Signs of infection (fever, growing tenderness at the surgery site, a large amount of drainage or bleeding, severe pain, foul-smelling drainage, redness, swelling).  2. It has been over 8 to 10 hours since surgery and you are still not able to urinate (pass water).  3. Headache for over 24 hours.  4. Numbness, tingling or weakness the day after surgery (if you had spinal anesthesia).  Your doctor is:       Dr. Magdalene Adkins, Ophthalmology: [phone]               Or dial [phone] and ask for the resident on call for:  Ophthalmology  For emergency care, call the:  [facility]:  [phone] (TTY for hearing impaired: [phone])

## 2018-09-18 NOTE — IP AVS SNAPSHOT
MRN:[id number]                      After Visit Summary   9/18/2018    Froylan Edwards    MRN: [id number]           Thank you!     Thank you for choosing [place] for your care. Our goal is always to provide you with excellent care. Hearing back from our patients is one way we can continue to improve our services. Please take a few minutes to complete the written survey that you may receive in the mail after you visit with us. Thank you!        Patient Information     Date Of Birth          1960        About your hospital stay     You were admitted on:  September 18, 2018 You last received care in the:  [facility]    You were discharged on:  September 18, 2018       Who to Call     For medical emergencies, please call 911.  For non-urgent questions about your medical care, please call your primary care provider or clinic, None  For questions related to your surgery, please call your surgery clinic        Attending Provider     Provider Specialty    Magdalene Adkins MD Ophthalmology       Primary Care Provider Fax #    Physician No Ref-Primary [phone]      After Care Instructions     Activity       Avoid strenuous activities the next several days.                  Your next 10 appointments already scheduled     Sep 19, 2018 12:45 PM CDT   Post-Op with Magdalene Adkins MD   Eye Clinic ([facility])    [place] [address] [address]   [phone]            Sep 26, 2018  1:45 PM CDT   Post-Op with Magdalene Adkins MD   Eye Clinic ([facility])    [address] [address]   [phone]              Further instructions from your care team       [facility]  Home Care Following Anesthesia  For 24 hours after surgery:  1. Get plenty of rest.  A responsible adult must stay  with you for at least 24 hours after you leave the surgery center.  2. Do not drive or use heavy equipment.  If you have weakness or tingling, don't drive or use heavy equipment until this feeling goes away.   3. Do not drink alcohol.   4. Avoid strenuous or risky activities.  Ask for help when climbing stairs.  5. You may feel lightheaded.  IF so, sit for a few minutes before standing.  Have someone help you get up.   6. If you have nausea (feel sick to your stomach): Drink only clear liquids such as apple juice, ginger ale, broth or 7-Up.  Rest may also help.  Be sure to drink enough fluids.  Move to a regular diet as you feel able.   7. You may have a slight fever.  Call the doctor if your fever is over 100 F (37.7 C) (taken under the tongue) or lasts longer than 24 hours.  8. You may have a dry mouth, a sore throat, muscle aches or trouble sleeping. These should go away after 24 hours.  9. Do not make important or legal decisions.               Tips for taking pain medications  To get the best pain relief possible, remember these points:    Take pain medications as directed, before pain becomes severe.    Pain medication can upset your stomach: taking it with food may help.    Constipation is a common side effect of pain medication. Drink plenty of  fluids.    Eat foods high in fiber. Take a stool softener if recommended by your doctor or pharmacist.    Do not drink alcohol, drive or operate machinery while taking pain medications.    Ask about other ways to control pain, such as with heat, ice or relaxation.    Tylenol/Acetaminophen Consumption  To help encourage the safe use of acetaminophen, the makers of TYLENOL  have lowered the maximum daily dose for single-ingredient Extra Strength TYLENOL  (acetaminophen) products sold in the [place] from 8 pills per day (4,000 mg) to 6 pills per day (3,000 mg). The dosing interval has also changed from 2 pills every 4-6 hours to 2 pills every 6 hours.    If you feel your  pain relief is insufficient, you may take Tylenol/Acetaminophen in addition to your narcotic pain medication.     Be careful not to exceed 3,000 mg of Tylenol/Acetaminophen in a 24 hour period from all sources.    If you are taking extra strength Tylenol/acetaminophen (500 mg), the maximum dose is 6 tablets in 24 hours.    If you are taking regular strength acetaminophen (325 mg), the maximum dose is 9 tablets in 24 hours.    Call a doctor for any of the followin. Signs of infection (fever, growing tenderness at the surgery site, a large amount of drainage or bleeding, severe pain, foul-smelling drainage, redness, swelling).  2. It has been over 8 to 10 hours since surgery and you are still not able to urinate (pass water).  3. Headache for over 24 hours.  4. Numbness, tingling or weakness the day after surgery (if you had spinal anesthesia).  Your doctor is:       Dr. Magdalene Adkins, Ophthalmology: [phone]               Or dial [phone] and ask for the resident on call for:  Ophthalmology  For emergency care, call the:  [facility]:  [phone] (TTY for hearing impaired: [phone])                Pending Results     No orders found from 2018 to 2018.            Admission Information     Date & Time Provider Department Dept. Phone    2018 Magdalene Adkins MD [facility] [phone]      Your Vitals Were     Blood Pressure Temperature Respirations Pulse Oximetry          146/94 98.1  F (36.7  C) (Oral) 16 97%        Omega[employer] Information     [employer] is an electronic gateway that provides easy, online access to your medical records. With Omega[employer], you can request a clinic appointment, read your test results, renew a prescription or communicate with your care team.     To sign up for Omega[employer] visit the website at [email or web address]   You will be asked to enter the access code listed below, as well as some personal  information. Please follow the directions to create your username and password.     Your access code is: [email or web address]  Expires: 2018  6:48 PM     Your access code will  in 90 days. If you need help or a new code, please contact your [facility] or call [phone] for assistance.        Care EveryWhere ID     This is your Care EveryWhere ID. This could be used by other organizations to access your [place] medical records  [id number]        Equal Access to Services     BULMARO CLAIRE : Hadii aad ku hadasho Soomaali, waaxda luqadaha, qaybta kaalmada adeegyada, waxay carolain haydelphinen arnaud dickson . So [facility] [phone].    ATENCIÓN: Si habla español, tiene a ambrocio disposición servicios gratuitos de asistencia lingüística. Llame al [phone].    We comply with applicable federal civil rights laws and [place] laws. We do not discriminate on the basis of race, color, national origin, age, disability, sex, sexual orientation, or gender identity.               Review of your medicines      UNREVIEWED medicines. Ask your doctor about these medicines        Dose / Directions    busPIRone 10 MG tablet   Commonly known as:  BUSPAR        Dose:  10 mg   Take 10 mg by mouth   Refills:  0       * clonazePAM 1 MG tablet   Commonly known as:  klonoPIN   Ask about: Which instructions should I use?        Dose:  1 mg   Take 1 mg by mouth 2 times daily as needed   Refills:  0       * clonazePAM 0.5 MG tablet   Commonly known as:  klonoPIN   Ask about: Which instructions should I use?        Dose:  0.5 mg   Take 0.5 mg by mouth   Refills:  0       dicyclomine 10 MG capsule   Commonly known as:  BENTYL        take 1 capsule by mouth three times a day with meals as needed   Refills:  0       ezetimibe 10 MG tablet   Commonly known as:  ZETIA        Dose:  10 mg   Take 10 mg by mouth   Refills:  0       insulin regular 100 UNIT/ML injection   Commonly known as:  HumuLIN R/NovoLIN R         Dose:  20 Units   Inject 20 Units Subcutaneous   Refills:  0       levocetirizine 5 MG tablet   Commonly known as:  XYZAL        Dose:  5 mg   Take 5 mg by mouth   Refills:  0       traZODone 50 MG tablet   Commonly known as:  DESYREL        0.5 tab twice a day as scheduled and 0.5 tab as needed daily for anxiety. 1-2 tab at bed time.   Refills:  0       * Notice:  This list has 2 medication(s) that are the same as other medications prescribed for you. Read the directions carefully, and ask your doctor or other care provider to review them with you.      START taking        Dose / Directions    neomycin-polymixin-dexamethasone ophthalmic suspension   Commonly known as:  MAXITROL   Used for:  Post-op pain        Dose:  1 drop   Apply 1 drop to eye 4 times daily Instill into operative eye(s) per physician instructions.   Quantity:  5 mL   Refills:  0         CONTINUE these medicines which have NOT CHANGED        Dose / Directions    aspirin 81 MG tablet        Take by mouth daily   Refills:  0       atorvastatin 40 MG tablet   Commonly known as:  LIPITOR        Dose:  40 mg   Take 40 mg by mouth daily   Refills:  0       buPROPion 150 MG 24 hr tablet   Commonly known as:  WELLBUTRIN XL        Dose:  150 mg   Take 150 mg by mouth every morning   Refills:  0       carboxymethylcellulose sodium 0.5 % Soln ophthalmic solution   Generic drug:  carboxymethylcellulose        Dose:  1 drop   1 drop as needed Rarely uses   Refills:  0       clopidogrel 75 MG tablet   Commonly known as:  PLAVIX        Take by mouth daily   Refills:  0       glucagon 1 MG kit        Dose:  1 mg   1 mg as needed for low blood sugar   Refills:  0       LANTUS SC        Dose:  80 Units   Inject 80 Units Subcutaneous At Bedtime   Refills:  0       losartan 100 MG tablet   Commonly known as:  COZAAR        Dose:  0.5 mg   Take 0.5 mg by mouth daily   Refills:  0       melatonin 3 MG tablet        Dose:  2 tablet   Take 2 tablets by mouth nightly as  needed   Refills:  0       metFORMIN 500 MG tablet   Commonly known as:  GLUCOPHAGE        Dose:  500 mg   Take 500 mg by mouth daily (with breakfast)   Refills:  0       Multi-vitamin Tabs tablet        Dose:  1 tablet   Take 1 tablet by mouth daily   Refills:  0       * NOVOLOG SC        Sliding scale   Refills:  0       * NovoLOG FLEXPEN 100 UNIT/ML injection   Generic drug:  insulin aspart        Dose:  10-12 Units   Inject 10-12 Units Subcutaneous Take with snacks or supplements for high blood sugar   Refills:  0       QUEtiapine 25 MG tablet   Commonly known as:  SEROquel   Used for:  Post-op pain        Dose:  12.5-25 mg   Take 0.5-1 tablets (12.5-25 mg) by mouth nightly as needed (for anxiety or sleeplessness)   Quantity:  20 tablet   Refills:  0       testosterone cypionate 200 MG/ML injection   Commonly known as:  DEPOTESTOTERONE        Dose:  50 mg   Inject 50 mg into the muscle once a week   Refills:  0       VITAMIN D3 PO        Dose:  8000 Int'l Units   Take 8,000 Int'l Units by mouth daily   Refills:  0       * Notice:  This list has 2 medication(s) that are the same as other medications prescribed for you. Read the directions carefully, and ask your doctor or other care provider to review them with you.      STOP taking     metoprolol tartrate 50 MG tablet   Commonly known as:  LOPRESSOR                Where to get your medicines      These medications were sent to [facility] [address] [address]    Hours:  TRANSPLANT PHONE NUMBER [phone] Phone:  [phone]     neomycin-polymixin-dexamethasone ophthalmic suspension                Protect others around you: Learn how to safely use, store and throw away your medicines at www.disposemymeds.org.             Medication List: This is a list of all your medications and when to take them. Check marks below indicate your daily home schedule. Keep this  list as a reference.      Medications           Morning Afternoon Evening Bedtime As Needed    aspirin 81 MG tablet   Take by mouth daily                                atorvastatin 40 MG tablet   Commonly known as:  LIPITOR   Take 40 mg by mouth daily                                buPROPion 150 MG 24 hr tablet   Commonly known as:  WELLBUTRIN XL   Take 150 mg by mouth every morning                                busPIRone 10 MG tablet   Commonly known as:  BUSPAR   Take 10 mg by mouth                                carboxymethylcellulose sodium 0.5 % Soln ophthalmic solution   1 drop as needed Rarely uses   Generic drug:  carboxymethylcellulose                                clopidogrel 75 MG tablet   Commonly known as:  PLAVIX   Take by mouth daily                                dicyclomine 10 MG capsule   Commonly known as:  BENTYL   take 1 capsule by mouth three times a day with meals as needed                                ezetimibe 10 MG tablet   Commonly known as:  ZETIA   Take 10 mg by mouth                                glucagon 1 MG kit   1 mg as needed for low blood sugar                                insulin regular 100 UNIT/ML injection   Commonly known as:  HumuLIN R/NovoLIN R   Inject 20 Units Subcutaneous                                LANTUS SC   Inject 80 Units Subcutaneous At Bedtime                                levocetirizine 5 MG tablet   Commonly known as:  XYZAL   Take 5 mg by mouth                                losartan 100 MG tablet   Commonly known as:  COZAAR   Take 0.5 mg by mouth daily                                melatonin 3 MG tablet   Take 2 tablets by mouth nightly as needed                                metFORMIN 500 MG tablet   Commonly known as:  GLUCOPHAGE   Take 500 mg by mouth daily (with breakfast)                                Multi-vitamin Tabs tablet   Take 1 tablet by mouth daily                                neomycin-polymixin-dexamethasone ophthalmic  suspension   Commonly known as:  MAXITROL   Apply 1 drop to eye 4 times daily Instill into operative eye(s) per physician instructions.                                * NOVOLOG SC   Sliding scale                                * NovoLOG FLEXPEN 100 UNIT/ML injection   Inject 10-12 Units Subcutaneous Take with snacks or supplements for high blood sugar   Generic drug:  insulin aspart                                QUEtiapine 25 MG tablet   Commonly known as:  SEROquel   Take 0.5-1 tablets (12.5-25 mg) by mouth nightly as needed (for anxiety or sleeplessness)                                testosterone cypionate 200 MG/ML injection   Commonly known as:  DEPOTESTOTERONE   Inject 50 mg into the muscle once a week                                traZODone 50 MG tablet   Commonly known as:  DESYREL   0.5 tab twice a day as scheduled and 0.5 tab as needed daily for anxiety. 1-2 tab at bed time.                                VITAMIN D3 PO   Take 8,000 Int'l Units by mouth daily                                * Notice:  This list has 2 medication(s) that are the same as other medications prescribed for you. Read the directions carefully, and ask your doctor or other care provider to review them with you.      ASK your doctor about these medications           Morning Afternoon Evening Bedtime As Needed    * clonazePAM 1 MG tablet   Commonly known as:  klonoPIN   Take 1 mg by mouth 2 times daily as needed   Ask about: Which instructions should I use?                                * clonazePAM 0.5 MG tablet   Commonly known as:  klonoPIN   Take 0.5 mg by mouth   Ask about: Which instructions should I use?                                * Notice:  This list has 2 medication(s) that are the same as other medications prescribed for you. Read the directions carefully, and ask your doctor or other care provider to review them with you.

## 2018-09-19 ENCOUNTER — OFFICE VISIT (OUTPATIENT)
Dept: OPHTHALMOLOGY | Facility: CLINIC | Age: 58
End: 2018-09-19
Attending: OPHTHALMOLOGY
Payer: MEDICARE

## 2018-09-19 DIAGNOSIS — Z98.890 POSTOPERATIVE EYE STATE: Primary | ICD-10-CM

## 2018-09-19 PROCEDURE — G0463 HOSPITAL OUTPT CLINIC VISIT: HCPCS | Mod: ZF

## 2018-09-19 RX ORDER — DORZOLAMIDE HYDROCHLORIDE AND TIMOLOL MALEATE 20; 5 MG/ML; MG/ML
1 SOLUTION/ DROPS OPHTHALMIC 2 TIMES DAILY
Qty: 1 ML | Refills: 0 | Status: SHIPPED | OUTPATIENT
Start: 2018-09-19 | End: 2018-09-21

## 2018-09-19 RX ORDER — ATROPINE SULFATE 10 MG/ML
1 SOLUTION/ DROPS OPHTHALMIC 2 TIMES DAILY
COMMUNITY
End: 2018-10-24

## 2018-09-19 ASSESSMENT — CONF VISUAL FIELD
OD_INFERIOR_NASAL_RESTRICTION: 1
OS_INFERIOR_TEMPORAL_RESTRICTION: 1
OS_INFERIOR_NASAL_RESTRICTION: 1
OS_SUPERIOR_NASAL_RESTRICTION: 1
OS_SUPERIOR_TEMPORAL_RESTRICTION: 1
OD_INFERIOR_TEMPORAL_RESTRICTION: 1
OD_SUPERIOR_TEMPORAL_RESTRICTION: 1
METHOD: COUNTING FINGERS
OD_SUPERIOR_NASAL_RESTRICTION: 1

## 2018-09-19 ASSESSMENT — VISUAL ACUITY
METHOD: SNELLEN - LINEAR
OS_SC: LP WITH PROJECTION
OD_SC: LP WITH PROJECTION

## 2018-09-19 ASSESSMENT — TONOMETRY
IOP_METHOD: TONOPEN
OS_IOP_MMHG: 27
OD_IOP_MMHG: 31

## 2018-09-19 NOTE — MR AVS SNAPSHOT
After Visit Summary   2018    Froylan Edwards    MRN: [id number]           Patient Information     Date Of Birth          1960        Visit Information        Provider Department      2018 12:45 PM Magdalene Adkins MD Eye Clinic        Today's Diagnoses     Postoperative eye state    -  1       Follow-ups after your visit        Follow-up notes from your care team     Return in about 1 week (around 2018).      Your next 10 appointments already scheduled     Sep 26, 2018  1:45 PM CDT   Post-Op with Magdalene Adkins MD   Eye Clinic ([facility])    [address]  9[facility] [address] [place] [id number] [phone]              Who to contact     Please call your clinic at [phone] to:    Ask questions about your health    Make or cancel appointments    Discuss your medicines    Learn about your test results    Speak to your doctor            Additional Information About Your Visit        MyChart Information     [employer] is an electronic gateway that provides easy, online access to your medical records. With [employer], you can request a clinic appointment, read your test results, renew a prescription or communicate with your care team.     To sign up for [employer] visit the website at [email or web address]   You will be asked to enter the access code listed below, as well as some personal information. Please follow the directions to create your username and password.     Your access code is: [email or web address]  Expires: 2018  6:48 PM     Your access code will  in 90 days. If you need help or a new code, please contact your [facility] or call [phone] for assistance.        Care EveryWhere ID     This is your Care EveryWhere ID. This could be used by other organizations to access your [place] medical records  [id number]         Blood Pressure from Last 3 Encounters:   18  133/81   08/12/15 152/60   05/07/15 123/77    Weight from Last 3 Encounters:   08/12/15 86.2 kg (190 lb)   04/28/15 95.3 kg (210 lb)              Today, you had the following     No orders found for display         Today's Medication Changes          These changes are accurate as of 9/19/18 11:59 PM.  If you have any questions, ask your nurse or doctor.               Start taking these medicines.        Dose/Directions    dorzolamide-timolol 2-0.5 % ophthalmic solution   Commonly known as:  COSOPT   Used for:  Postoperative eye state        Dose:  1 drop   Place 1 drop into the right eye 2 times daily for 7 days   Quantity:  1 mL   Refills:  0            Where to get your medicines      These medications were sent to [facility] [address] [place] [address]     Phone:  [phone]     dorzolamide-timolol 2-0.5 % ophthalmic solution                Primary Care Provider Fax #    Physician No Ref-Primary [phone]       No address on file        Equal Access to Services     [facility]: Hadii anshul ontiveros Sosevero, waaxda luqadaha, qaybta kaalmada adeveronica, colt dickson . So [facility] [phone].    ATENCIÓN: Si habla español, tiene a ambrocio disposición servicios gratuitos de asistencia lingüística. Llame al [phone].    We comply with applicable federal civil rights laws and [place] laws. We do not discriminate on the basis of race, color, national origin, age, disability, sex, sexual orientation, or gender identity.            Thank you!     Thank you for choosing [facility] CLINIC  for your care. Our goal is always to provide you with excellent care. Hearing back from our patients is one way we can continue to improve our services. Please take a few minutes to complete the written survey that you may receive in the mail after your visit with us. Thank you!             Your Updated Medication List - Protect others around you:  Learn how to safely use, store and throw away your medicines at www.disposemymeds.org.          This list is accurate as of 9/19/18 11:59 PM.  Always use your most recent med list.                   Brand Name Dispense Instructions for use Diagnosis    aspirin 81 MG tablet      Take by mouth daily        atorvastatin 40 MG tablet    LIPITOR     Take 40 mg by mouth daily        atropine 1 % ophthalmic solution      Place 1 drop into the right eye 2 times daily        buPROPion 150 MG 24 hr tablet    WELLBUTRIN XL     Take 150 mg by mouth every morning        busPIRone 10 MG tablet    BUSPAR     Take 10 mg by mouth        carboxymethylcellulose sodium 0.5 % Soln ophthalmic solution   Generic drug:  carboxymethylcellulose      1 drop as needed Rarely uses        * clonazePAM 1 MG tablet    klonoPIN     Take 1 mg by mouth 2 times daily as needed        * clonazePAM 0.5 MG tablet    klonoPIN     Take 0.5 mg by mouth        clopidogrel 75 MG tablet    PLAVIX     Take by mouth daily        dicyclomine 10 MG capsule    BENTYL     take 1 capsule by mouth three times a day with meals as needed        dorzolamide-timolol 2-0.5 % ophthalmic solution    COSOPT    1 mL    Place 1 drop into the right eye 2 times daily for 7 days    Postoperative eye state       ezetimibe 10 MG tablet    ZETIA     Take 10 mg by mouth        glucagon 1 MG kit      1 mg as needed for low blood sugar        insulin regular 100 UNIT/ML injection    HumuLIN R/NovoLIN R     Inject 20 Units Subcutaneous        LANTUS SC      Inject 80 Units Subcutaneous At Bedtime        levocetirizine 5 MG tablet    XYZAL     Take 5 mg by mouth        losartan 100 MG tablet    COZAAR     Take 0.5 mg by mouth daily        melatonin 3 MG tablet      Take 2 tablets by mouth nightly as needed        metFORMIN 500 MG tablet    GLUCOPHAGE     Take 500 mg by mouth daily (with breakfast)        Multi-vitamin Tabs tablet      Take 1 tablet by mouth daily         neomycin-polymixin-dexamethasone ophthalmic suspension    MAXITROL    5 mL    Apply 1 drop to eye 4 times daily Instill into operative eye(s) per physician instructions.    Post-op pain       * NOVOLOG SC      Sliding scale        * NovoLOG FLEXPEN 100 UNIT/ML injection   Generic drug:  insulin aspart      Inject 10-12 Units Subcutaneous Take with snacks or supplements for high blood sugar        QUEtiapine 25 MG tablet    SEROquel    20 tablet    Take 0.5-1 tablets (12.5-25 mg) by mouth nightly as needed (for anxiety or sleeplessness)    Post-op pain       testosterone cypionate 200 MG/ML injection    DEPOTESTOTERONE     Inject 50 mg into the muscle once a week        traZODone 50 MG tablet    DESYREL     0.5 tab twice a day as scheduled and 0.5 tab as needed daily for anxiety. 1-2 tab at bed time.        VITAMIN D3 PO      Take 8,000 Int'l Units by mouth daily        * Notice:  This list has 4 medication(s) that are the same as other medications prescribed for you. Read the directions carefully, and ask your doctor or other care provider to review them with you.

## 2018-09-19 NOTE — PROGRESS NOTES
"CC -    Postoperative day 1 s/p vitrectomy right eye for floater    Pain and some discomfort over night.    HPI -   ARGUS patient OS / Retinitis pigmentosa. Feels device helps a little, with reading & with navigation, but not enough to use routinely with ADLs.  Occasional discomfort, \"zaps\", not requiring meds.   Sees floater OD, moves around, bothersome. left eye stable .     POH  ARGUS II OS 4/28/15  Anterior chamber & post segment washout 5/715 for NCVH and hyphema OS   Status post  YAG attempt at vitreolysis right eye  8/2015    Gtts: Genteal gel (occasional)    RETINAL IMAGING  Optical Coherence Tomography 09/05/18   left eye: implant in good position, outer retina atrophic changes stable  Right eye: outer retina atrophic changes    Fundus pic consistent with exam 09/05/18     ASSESSMENT & PLAN    0. Postoperative day 1 s/p Pars plana vitrectomy (PPV) right eye   - start post operative drops   - IOP elevated - will start Cosopt twice a day for one week    - Follow up in one week    1. ARGUS II OS    -  Pars plana vitrectomy (PPV)/Argus II 4/28/15   -  Anterior chamber & post segment washout/PPV left eye 5/7/15   -  retina attached, no infection, intraocular pressure good.    - external components look good and no exposure   - implant in good position   - observe   - recheck 6 months  Patient using the device about 3 times per weeks for 2-3 hours    2.  History of chronic Pain left eye   - much better now    3. ERROL   - recommended using genteal 2-3/day     4.  Advanced Retinitis pigmentosa    5.  Pseudophakia both eyes     6.  Floater OD   - given his vision, surprising that it would be symptomatic   - patient's description is consistent with exam however   - failed attempted lysis 8/12/15   - patient reports it bothers him, states he sees the floater constantly and affects him the use of Argus II left ey,  thinking there is something there. Also bothers him even if he is not using the Argus II   - s/p 25 g Pars " plana vitrectomy (PPV); AFx - peribulbar block 45 min surgery      Medications to the operative eye:  Maxitrol drops four times a day OD  Maxitrol ointment at bedtime OD  Cosopt twice a day OD    Eye shield at bedtime  No heavy lifting, no strenuous activity  Follow up in one week  Plan for OCTA both eyes     Rocky Krishna MD  PGY-3 Ophthalmology      ~~~~~~~~~~~~~~~~~~~~~~~~~~~~~~~~~~   Complete documentation of historical and exam elements from today's encounter can be found in the full encounter summary report (not reduplicated in this progress note).  I personally obtained the chief complaint(s) and history of present illness.  I confirmed and edited as necessary the review of systems, past medical/surgical history, family history, social history, and examination findings as documented by others; and I examined the patient myself.  I personally reviewed the relevant tests, images, and reports as documented above.  I personally reviewed the ophthalmic test(s) associated with this encounter, agree with the interpretation(s) as documented by the resident/fellow, and have edited the corresponding report(s) as necessary.   I formulated and edited as necessary the assessment and plan and discussed the findings and management plan with the patient and family    Magdalene Adkins MD  .  Retina Service   Department of Ophthalmology and Visual Neurosciences   [facility]  Phone: [phone]   Fax: [phone]

## 2018-09-19 NOTE — NURSING NOTE
Chief Complaints and History of Present Illnesses   Patient presents with     Post Op (Ophthalmology) Right Eye      1 day after vitrectomy     HPI    Last Eye Exam:  9/5/18   Affected eye(s):  Right   Symptoms:        Duration:  1 day   Frequency:  Constant          Comments:  Froylan is here 1 day post op after vitrectomy RE. He says he did not sleep well last night. He says his RE is itchy constantly since the surgery.     Jimmy Dave COT 12:37 PM September 19, 2018

## 2018-09-20 ENCOUNTER — TELEPHONE (OUTPATIENT)
Dept: OPHTHALMOLOGY | Facility: CLINIC | Age: 58
End: 2018-09-20

## 2018-09-20 ASSESSMENT — SLIT LAMP EXAM - LIDS
COMMENTS: NORMAL
COMMENTS: NORMAL

## 2018-09-20 ASSESSMENT — EXTERNAL EXAM - RIGHT EYE: OD_EXAM: NORMAL

## 2018-09-20 ASSESSMENT — EXTERNAL EXAM - LEFT EYE: OS_EXAM: NORMAL

## 2018-09-21 ENCOUNTER — TELEPHONE (OUTPATIENT)
Dept: OPHTHALMOLOGY | Facility: CLINIC | Age: 58
End: 2018-09-21

## 2018-09-21 DIAGNOSIS — Z98.890 POSTOPERATIVE EYE STATE: ICD-10-CM

## 2018-09-21 RX ORDER — DORZOLAMIDE HYDROCHLORIDE AND TIMOLOL MALEATE 20; 5 MG/ML; MG/ML
1 SOLUTION/ DROPS OPHTHALMIC 2 TIMES DAILY
Qty: 1 ML | Refills: 0 | Status: SHIPPED | OUTPATIENT
Start: 2018-09-21 | End: 2018-10-24

## 2018-09-21 NOTE — TELEPHONE ENCOUNTER
[facility] on [facility] has cosopt eye drop  Rx sent there for pt pickup  Pt aware  Jonathan Osorio RN 3:00 PM 09/21/18

## 2018-09-21 NOTE — TELEPHONE ENCOUNTER
[facility] Call Center    Phone Message    May a detailed message be left on voicemail: yes    Reason for Call: Other: Pt called back urgently requesting an alternative to the drops he was most recently prescribed. Pt has not had the medication, is getting frustrated because he wants to follow the DrMike's orders. Pt requests communication because he would like to know if it's not important to take these drops then he won't worry. Pt requests a call asap.      Action Taken: Message routed to:  [facility] ([facility] Eye

## 2018-09-21 NOTE — TELEPHONE ENCOUNTER
[facility] Call Center    Phone Message    May a detailed message be left on voicemail: yes    Reason for Call: PT had surgery on Tuesday and has been unable to get the RX dorzolamide-timolol (COSOPT) 2-0.5 % ophthalmic solution because it's not available at the pharmacy. Pharmacy needs an alternative RX to be sent over to them at the [facility] [address] [place] [address]. PT needs it sent ASAP since they have been unable to use it post surgery. Thanks.       Action Taken: Message routed to:  [facility] ([facility] EYE

## 2018-09-27 ENCOUNTER — OFFICE VISIT (OUTPATIENT)
Dept: OPHTHALMOLOGY | Facility: CLINIC | Age: 58
End: 2018-09-27
Attending: OPHTHALMOLOGY
Payer: MEDICARE

## 2018-09-27 DIAGNOSIS — Z48.810 AFTERCARE FOLLOWING SURGERY OF A SENSORY ORGAN: ICD-10-CM

## 2018-09-27 DIAGNOSIS — H35.52 RP (RETINITIS PIGMENTOSA): Primary | ICD-10-CM

## 2018-09-27 PROCEDURE — G0463 HOSPITAL OUTPT CLINIC VISIT: HCPCS | Mod: ZF

## 2018-09-27 ASSESSMENT — VISUAL ACUITY
OD_SC: LP
METHOD: SNELLEN - LINEAR

## 2018-09-27 ASSESSMENT — CONF VISUAL FIELD
OS_INFERIOR_NASAL_RESTRICTION: 1
OS_INFERIOR_TEMPORAL_RESTRICTION: 1
OS_SUPERIOR_TEMPORAL_RESTRICTION: 1
OS_SUPERIOR_NASAL_RESTRICTION: 1
OD_SUPERIOR_TEMPORAL_RESTRICTION: 1
OD_INFERIOR_NASAL_RESTRICTION: 1
OD_INFERIOR_TEMPORAL_RESTRICTION: 1
OD_SUPERIOR_NASAL_RESTRICTION: 1

## 2018-09-27 ASSESSMENT — EXTERNAL EXAM - LEFT EYE: OS_EXAM: NORMAL

## 2018-09-27 ASSESSMENT — TONOMETRY
OS_IOP_MMHG: 06
IOP_METHOD: TONOPEN
OD_IOP_MMHG: 05

## 2018-09-27 ASSESSMENT — EXTERNAL EXAM - RIGHT EYE: OD_EXAM: NORMAL

## 2018-09-27 ASSESSMENT — SLIT LAMP EXAM - LIDS
COMMENTS: NORMAL
COMMENTS: NORMAL

## 2018-09-27 NOTE — PATIENT INSTRUCTIONS
- Maxitrol three times a day x 1 week, then twice a day x 1 ween, then once a day x 1 ween and stop   - Follow up in one month  - wall shield at night

## 2018-09-27 NOTE — MR AVS SNAPSHOT
After Visit Summary   2018    Froylan Edwards    MRN: [id number]           Patient Information     Date Of Birth          1960        Visit Information        Provider Department      2018 1:00 PM Magdalene Adkins MD [facility]        Today's Diagnoses     RP (retinitis pigmentosa)    -  1      Care Instructions    - Maxitrol three times a day x 1 week, then twice a day x 1 ween, then once a day x 1 ween and stop   - Follow up in one month  - wall shield at night           Follow-ups after your visit        Your next 10 appointments already scheduled     Oct 24, 2018  1:45 PM CDT   Post-Op with Magdalene Adkins MD   Eye Clinic ([facility])    [address] [id number] [phone]              Who to contact     Please call your clinic at [phone] to:    Ask questions about your health    Make or cancel appointments    Discuss your medicines    Learn about your test results    Speak to your doctor            Additional Information About Your Visit        [employer] Information     [employer] is an electronic gateway that provides easy, online access to your medical records. With [employer], you can request a clinic appointment, read your test results, renew a prescription or communicate with your care team.     To sign up for [employer] visit the website at [email or web address]   You will be asked to enter the access code listed below, as well as some personal information. Please follow the directions to create your username and password.     Your access code is: [email or web address]  Expires: 2018  6:48 PM     Your access code will  in 90 days. If you need help or a new code, please contact your [facility] or call [phone] for assistance.        Care EveryWhere ID     This is your Care EveryWhere ID. This could be used by other organizations to access your  [facility] medical records  [id number]         Blood Pressure from Last 3 Encounters:   09/18/18 133/81   08/12/15 152/60   05/07/15 123/77    Weight from Last 3 Encounters:   08/12/15 86.2 kg (190 lb)   04/28/15 95.3 kg (210 lb)              We Performed the Following     OCT Retina Spectralis OU (both eyes)        Primary Care Provider Fax #    Physician No Ref-Primary [phone]       No address on file        Equal Access to Services     BULMARO CLAIRE : Hadii aad ku hadasho Soomaali, waaxda luqadaha, qaybta kaalmada adeegyada, waxay idiin hayaan adeariadne hill. So [facility] [phone].    ATENCIÓN: Si habla español, tiene a ambrocio disposición servicios gratuitos de asistencia lingüística. [facility] [phone].    We comply with applicable federal civil rights laws and [place] laws. We do not discriminate on the basis of race, color, national origin, age, disability, sex, sexual orientation, or gender identity.            Thank you!     Thank you for choosing EYE CLINIC  for your care. Our goal is always to provide you with excellent care. Hearing back from our patients is one way we can continue to improve our services. Please take a few minutes to complete the written survey that you may receive in the mail after your visit with us. Thank you!             Your Updated Medication List - Protect others around you: Learn how to safely use, store and throw away your medicines at www.disposemymeds.org.          This list is accurate as of 9/27/18  2:19 PM.  Always use your most recent med list.                   Brand Name Dispense Instructions for use Diagnosis    aspirin 81 MG tablet      Take by mouth daily        atorvastatin 40 MG tablet    LIPITOR     Take 40 mg by mouth daily        atropine 1 % ophthalmic solution      Place 1 drop into the right eye 2 times daily        buPROPion 150 MG 24 hr tablet    WELLBUTRIN XL     Take 150 mg by mouth every morning        busPIRone 10 MG tablet    BUSPAR     Take  10 mg by mouth        carboxymethylcellulose sodium 0.5 % Soln ophthalmic solution   Generic drug:  carboxymethylcellulose      1 drop as needed Rarely uses        * clonazePAM 1 MG tablet    klonoPIN     Take 1 mg by mouth 2 times daily as needed        * clonazePAM 0.5 MG tablet    klonoPIN     Take 0.5 mg by mouth        clopidogrel 75 MG tablet    PLAVIX     Take by mouth daily        dicyclomine 10 MG capsule    BENTYL     take 1 capsule by mouth three times a day with meals as needed        dorzolamide-timolol 2-0.5 % ophthalmic solution    COSOPT    1 mL    Place 1 drop into the right eye 2 times daily    Postoperative eye state       ezetimibe 10 MG tablet    ZETIA     Take 10 mg by mouth        glucagon 1 MG kit      1 mg as needed for low blood sugar        insulin regular 100 UNIT/ML injection    HumuLIN R/NovoLIN R     Inject 20 Units Subcutaneous        LANTUS SC      Inject 80 Units Subcutaneous At Bedtime        levocetirizine 5 MG tablet    XYZAL     Take 5 mg by mouth        losartan 100 MG tablet    COZAAR     Take 0.5 mg by mouth daily        melatonin 3 MG tablet      Take 2 tablets by mouth nightly as needed        metFORMIN 500 MG tablet    GLUCOPHAGE     Take 500 mg by mouth daily (with breakfast)        Multi-vitamin Tabs tablet      Take 1 tablet by mouth daily        neomycin-polymixin-dexamethasone ophthalmic suspension    MAXITROL    5 mL    Apply 1 drop to eye 4 times daily Instill into operative eye(s) per physician instructions.    Post-op pain       * NOVOLOG SC      Sliding scale        * NovoLOG FLEXPEN 100 UNIT/ML injection   Generic drug:  insulin aspart      Inject 10-12 Units Subcutaneous Take with snacks or supplements for high blood sugar        QUEtiapine 25 MG tablet    SEROquel    20 tablet    Take 0.5-1 tablets (12.5-25 mg) by mouth nightly as needed (for anxiety or sleeplessness)    Post-op pain       testosterone cypionate 200 MG/ML injection    DEPOTESTOTERONE      Inject 50 mg into the muscle once a week        traZODone 50 MG tablet    DESYREL     0.5 tab twice a day as scheduled and 0.5 tab as needed daily for anxiety. 1-2 tab at bed time.        VITAMIN D3 PO      Take 8,000 Int'l Units by mouth daily        * Notice:  This list has 4 medication(s) that are the same as other medications prescribed for you. Read the directions carefully, and ask your doctor or other care provider to review them with you.

## 2018-09-27 NOTE — PROGRESS NOTES
"CC -    Postoperative day 9 s/p vitrectomy right eye for floater    Pain and some discomfort over night.    HPI -   ARGUS patient OS / Retinitis pigmentosa. Feels device helps a little, with reading & with navigation, but not enough to use routinely with ADLs.  Occasional discomfort, \"zaps\", not requiring meds.   Sees floater OD, moves around, bothersome. left eye stable .     POH  ARGUS II OS 4/28/15  Anterior chamber & post segment washout 5/715 for NCVH and hyphema OS   Status post  YAG attempt at vitreolysis right eye  8/2015    Gtts: Genteal gel (occasional)    RETINAL IMAGING  Optical Coherence Tomography 09/05/18   left eye: implant in good position, outer retina atrophic changes stable  Right eye: outer retina atrophic changes    Fundus pic consistent with exam 09/05/18     Plan for OCTA both eyes     ASSESSMENT & PLAN    0. Postoperative day 1 s/p Pars plana vitrectomy (PPV) right eye   - for vitreous floaters   - start post operative drops   - IOP normal - stop Cosopt    - Maxitrol three times a day x 1 week, then twice a day x 1 ween, then once a day x 1 ween and stop   - Follow up in one month    1. ARGUS II OS    -  Pars plana vitrectomy (PPV)/Argus II 4/28/15   -  Anterior chamber & post segment washout/PPV left eye 5/7/15   -  retina attached, no infection, intraocular pressure good.    - external components look good and no exposure   - implant in good position   - observe   - recheck 6 months  Patient using the device about 3 times per weeks for 2-3 hours  OCTa attempted today left eye- significant noise    2.  History of chronic Pain left eye   - much better now    3. ERROL   - recommended using genteal 2-3/day     4.  Advanced Retinitis pigmentosa    5.  Pseudophakia both eyes     Medications to the operative eye:  - Maxitrol three times a day x 1 week, then twice a day x 1 ween, then once a day x 1 ween and stop    Eye shield at bedtime  No heavy lifting, no strenuous activity   Follow up in one " month    ~~~~~~~~~~~~~~~~~~~~~~~~~~~~~~~~~~   Complete documentation of historical and exam elements from today's encounter can be found in the full encounter summary report (not reduplicated in this progress note).  I personally obtained the chief complaint(s) and history of present illness.  I confirmed and edited as necessary the review of systems, past medical/surgical history, family history, social history, and examination findings as documented by others; and I examined the patient myself.  I personally reviewed the relevant tests, images, and reports as documented above.  I personally reviewed the ophthalmic test(s) associated with this encounter, agree with the interpretation(s) as documented by the resident/fellow, and have edited the corresponding report(s) as necessary.   I formulated and edited as necessary the assessment and plan and discussed the findings and management plan with the patient and family    Magdalene Adkins MD  .  Retina Service   Department of Ophthalmology and Visual Neurosciences   [facility]  Phone: [phone]   Fax: [phone]

## 2018-09-27 NOTE — NURSING NOTE
Chief Complaints and History of Present Illnesses   Patient presents with     Post Op (Ophthalmology) Right Eye     s/p vitrectomy right eye for floater     HPI    Affected eye(s):  Both   Symptoms:        Frequency:  Constant       Do you have eye pain now?:  No      Comments:  +flashes RE  Thinking that he sees floaters occ  States va is the same since last visit  Isa Irwin COT 1:09 PM September 27, 2018

## 2018-10-11 ENCOUNTER — TELEPHONE (OUTPATIENT)
Dept: OPHTHALMOLOGY | Facility: CLINIC | Age: 58
End: 2018-10-11

## 2018-10-11 NOTE — TELEPHONE ENCOUNTER
[facility] Call Center    Phone Message    May a detailed message be left on voicemail: yes    Reason for Call: Other: Patient is requesting a call back about his bionic eye.      Action Taken: Message routed to:  [facility] kieran

## 2018-10-12 NOTE — TELEPHONE ENCOUNTER
[phone] number   General questions about retina implant  Prefers to discuss with dr. stevens    Note to dr. stevens for f/u call

## 2018-10-24 ENCOUNTER — OFFICE VISIT (OUTPATIENT)
Dept: OPHTHALMOLOGY | Facility: CLINIC | Age: 58
End: 2018-10-24
Attending: OPHTHALMOLOGY
Payer: MEDICARE

## 2018-10-24 DIAGNOSIS — Z48.810 AFTERCARE FOLLOWING SURGERY OF A SENSORY ORGAN: Primary | ICD-10-CM

## 2018-10-24 PROCEDURE — G0463 HOSPITAL OUTPT CLINIC VISIT: HCPCS | Mod: ZF

## 2018-10-24 ASSESSMENT — EXTERNAL EXAM - RIGHT EYE: OD_EXAM: NORMAL

## 2018-10-24 ASSESSMENT — VISUAL ACUITY
OD_SC: LP
OS_SC: LP
CORRECTION_TYPE: GLASSES
METHOD: SNELLEN - LINEAR

## 2018-10-24 ASSESSMENT — CONF VISUAL FIELD
OD_SUPERIOR_TEMPORAL_RESTRICTION: 1
OS_INFERIOR_TEMPORAL_RESTRICTION: 1
OS_INFERIOR_NASAL_RESTRICTION: 1
OD_INFERIOR_TEMPORAL_RESTRICTION: 1
OS_SUPERIOR_NASAL_RESTRICTION: 1
OD_SUPERIOR_NASAL_RESTRICTION: 1
METHOD: COUNTING FINGERS
OS_SUPERIOR_TEMPORAL_RESTRICTION: 1
OD_INFERIOR_NASAL_RESTRICTION: 1

## 2018-10-24 ASSESSMENT — SLIT LAMP EXAM - LIDS
COMMENTS: NORMAL
COMMENTS: NORMAL

## 2018-10-24 ASSESSMENT — TONOMETRY
IOP_METHOD: ICARE
OD_IOP_MMHG: 14
OS_IOP_MMHG: 12

## 2018-10-24 ASSESSMENT — EXTERNAL EXAM - LEFT EYE: OS_EXAM: NORMAL

## 2018-10-24 NOTE — PROGRESS NOTES
"CC -    Postoperative wek 5 s/p vitrectomy right eye for floater  9-18-18    Pain and some discomfort over night.    HPI -   ARGUS patient OS / Retinitis pigmentosa. Feels device helps a little, with reading & with navigation, but not enough to use routinely with ADLs.  Occasional discomfort, \"zaps\", not requiring meds.   Sees floater OD, moves around, bothersome. left eye stable .     POH  ARGUS II OS 4/28/15  Anterior chamber & post segment washout 5/715 for NCVH and hyphema OS   Status post  YAG attempt at vitreolysis right eye  8/2015    Gtts: Genteal gel (occasional)    RETINAL IMAGING  Optical Coherence Tomography 09/05/18   left eye: implant in good position, outer retina atrophic changes stable  Right eye: outer retina atrophic changes    Fundus pic consistent with exam 09/05/18     Plan for OCTA both eyes     ASSESSMENT & PLAN    0. Postoperative s/p Pars plana vitrectomy (PPV) right eye   - for vitreous floaters   - no eyedrops needed   - retina attached; looks good    1. ARGUS II OS    -  Pars plana vitrectomy (PPV)/Argus II 4/28/15   -  Anterior chamber & post segment washout/PPV left eye 5/7/15   -  retina attached, no infection, intraocular pressure good.    - external components look good and no exposure   - implant in good position   - observe   - recheck 6 months    Has mild blepharospasm with the use of argus II for about 1/2 hour last a few min and stop  More noticeable with the setting of the background switch   No pain associated   feel the twitching possibly since after surgery     Patient using the device about 3 times per weeks for 2-3 hours  OCTa attempted left eye- significant noise    2.  Dry eye syndrome - recommended using genteal 2-3/day     3.  Advanced Retinitis pigmentosa    4.  Pseudophakia both eyes      Follow up as schedule for the Argus II    ~~~~~~~~~~~~~~~~~~~~~~~~~~~~~~~~~~   Complete documentation of historical and exam elements from today's encounter can be found in the " full encounter summary report (not reduplicated in this progress note).  I personally obtained the chief complaint(s) and history of present illness.  I confirmed and edited as necessary the review of systems, past medical/surgical history, family history, social history, and examination findings as documented by others; and I examined the patient myself.  I personally reviewed the relevant tests, images, and reports as documented above.  I personally reviewed the ophthalmic test(s) associated with this encounter, agree with the interpretation(s) as documented by the resident/fellow, and have edited the corresponding report(s) as necessary.   I formulated and edited as necessary the assessment and plan and discussed the findings and management plan with the patient and family    Magdalene Adkins MD  .  Retina Service   Department of Ophthalmology and Visual Neurosciences   [facility]  Phone: [phone]   Fax: [phone]

## 2018-10-24 NOTE — NURSING NOTE
Chief Complaints and History of Present Illnesses   Patient presents with     Follow Up For     RP (retinitis pigmentosa)     HPI    Affected eye(s):  Both   Symptoms:     No floaters   Flashes   Itching      Duration:  4 weeks   Frequency:  Constant       Do you have eye pain now?:  No      Comments:  Patient presents for 4 wk f/u of RP (retinitis pigmentosa). Since the last visit the vision has been stable. He no longer sees the floater RE, intermittent flashes. The eyes itch but may be allergy related. No pain or discomfort, no redness or tears. Tapered off rx sx gtts. Using ATS PRN BE, only once since the sx. Does not wear corrective lenses. Uses sunglasses outdoors. Lucinda Bey COT 1:48 PM October 24, 2018

## 2018-10-24 NOTE — MR AVS SNAPSHOT
After Visit Summary   10/24/2018    Froylan Edwards    MRN: [id number]           Patient Information     Date Of Birth          1960        Visit Information        Provider Department      10/24/2018 1:45 PM [facility]dal[facility]        Today's Diagnoses     Aftercare following surgery of a sensory organ    -  1       Follow-ups after your visit        Who to contact     Please call your clinic at [phone] to:    Ask questions about your health    Make or cancel appointments    Discuss your medicines    Learn about your test results    Speak to your doctor            Additional Information About Your Visit        MyChart Information     [employer] is an electronic gateway that provides easy, online access to your medical records. With [employer], you can request a clinic appointment, read your test results, renew a prescription or communicate with your care team.     To sign up for [employer] visit the website at [email or web address]   You will be asked to enter the access code listed below, as well as some personal information. Please follow the directions to create your username and password.     Your access code is: [email or web address]  Expires: 2018  6:48 PM     Your access code will  in 90 days. If you need help or a new code, please contact your [facility] or call [phone] for assistance.        Care EveryWhere ID     This is your Care EveryWhere ID. This could be used by other organizations to access your [place] medical records  [id number]         Blood Pressure from Last 3 Encounters:   18 133/81   08/12/15 152/60   05/07/15 123/77    Weight from Last 3 Encounters:   08/12/15 86.2 kg (190 lb)   04/28/15 95.3 kg (210 lb)              Today, you had the following     No orders found for display       Primary Care Provider Fax #    Physician No Ref-Primary [phone]       No address on file        Equal Access to  Services     [facility]: Hadii anshul manzano rosangelaalexi Radhasevero, waaxda luqadaha, qaybta kaalmada jesica, colt dickson . So [facility] [phone].    ATENCIÓN: Si habla dona, tiene a ambrocio disposición servicios gratuitos de asistencia lingüística. Llame al [phone].    We comply with applicable federal civil rights laws and [place] laws. We do not discriminate on the basis of race, color, national origin, age, disability, sex, sexual orientation, or gender identity.            Thank you!     Thank you for choosing [facility]  for your care. Our goal is always to provide you with excellent care. Hearing back from our patients is one way we can continue to improve our services. Please take a few minutes to complete the written survey that you may receive in the mail after your visit with us. Thank you!             Your Updated Medication List - Protect others around you: Learn how to safely use, store and throw away your medicines at www.disposemymeds.org.          This list is accurate as of 10/24/18  2:18 PM.  Always use your most recent med list.                   Brand Name Dispense Instructions for use Diagnosis    aspirin 81 MG tablet      Take by mouth daily        atorvastatin 40 MG tablet    LIPITOR     Take 40 mg by mouth daily        buPROPion 150 MG 24 hr tablet    WELLBUTRIN XL     Take 150 mg by mouth every morning        busPIRone 10 MG tablet    BUSPAR     Take 10 mg by mouth        clopidogrel 75 MG tablet    PLAVIX     Take by mouth daily        dicyclomine 10 MG capsule    BENTYL     take 1 capsule by mouth three times a day with meals as needed        ezetimibe 10 MG tablet    ZETIA     Take 10 mg by mouth        glucagon 1 MG kit      1 mg as needed for low blood sugar        insulin regular 100 UNIT/ML injection    HumuLIN R/NovoLIN R     Inject 20 Units Subcutaneous        LANTUS SC      Inject 80 Units Subcutaneous At Bedtime        levocetirizine 5 MG tablet    XYZAL      Take 5 mg by mouth        losartan 100 MG tablet    COZAAR     Take 0.5 mg by mouth daily        melatonin 3 MG tablet      Take 2 tablets by mouth nightly as needed        metFORMIN 500 MG tablet    GLUCOPHAGE     Take 500 mg by mouth daily (with breakfast)        Multi-vitamin Tabs tablet      Take 1 tablet by mouth daily        * NOVOLOG SC      Sliding scale        * NovoLOG FLEXPEN 100 UNIT/ML injection   Generic drug:  insulin aspart      Inject 10-12 Units Subcutaneous Take with snacks or supplements for high blood sugar        testosterone cypionate 200 MG/ML injection    DEPOTESTOSTERONE     Inject 50 mg into the muscle once a week        traZODone 50 MG tablet    DESYREL     0.5 tab twice a day as scheduled and 0.5 tab as needed daily for anxiety. 1-2 tab at bed time.        VITAMIN D3 PO      Take 8,000 Int'l Units by mouth daily        * Notice:  This list has 2 medication(s) that are the same as other medications prescribed for you. Read the directions carefully, and ask your doctor or other care provider to review them with you.

## 2019-03-30 ENCOUNTER — TELEPHONE (OUTPATIENT)
Dept: OPHTHALMOLOGY | Facility: CLINIC | Age: 59
End: 2019-03-30

## 2019-03-30 NOTE — TELEPHONE ENCOUNTER
Spoke to pt this AM. He has been doing ATs q2 hours, BID warm compresses, AT gel q2 hours. Reports he is doing much better today, w/ minimal discomfort.     Pt will call back on-call ophthalmologist if syx worsen.

## 2019-03-30 NOTE — TELEPHONE ENCOUNTER
"Pt called yesterday about both eye mild irritation, left eye > right eye, and mild erythema and swelling of both eyelids. Irritation feels to be all at surface of eye. Complains that this has been present for about 1 week. Has not tried anything to soothe eyes. Denies mucopurulent discharge, no pain deep in eyes. Pt w/ ARGUSII in left eye - reports stable \"zaps\" of light but little meaningful visual interaction with the world. Left eye vision stable at light perception. Has not tried any sort of treatment.    After speaking w/ pt at length, recommended that he use artificial tears q2 hours, and artificial tear gel q 2 hours (alternating), and that he use warm compresses on both eyes, taking care not to scald himself. Pt agrees w/ this plan, and knows to call back on-call ophthalmologist if he feels any worsening of eye pain or a feeling of increased pressure in his eyes.     If pt does not note improvement w/ this regimen, he knows to call back.     Michael Lopez MD - PGY 2  Ophthalmology resident    "

## 2019-04-02 ENCOUNTER — TELEPHONE (OUTPATIENT)
Dept: OPHTHALMOLOGY | Facility: CLINIC | Age: 59
End: 2019-04-02

## 2019-04-02 NOTE — TELEPHONE ENCOUNTER
"Spoke to pt earlier today - he indicates that his left eye discomfort has not improved, and that his left eye now feels sore. This represents a change from when we spoke on Saturday. When I asked why he didn't call back over the weekend about worsening syx, pt indicated that he wanted to \"tough it out\" over the weekend. He has been using q2 hour ATs, q2 hour AT gel, and BID warm compresses. Says that they help sometimes, but sometimes not.    Pt has an appt w/ Dr. Adkins tomorrow afternoon. I instructed him to call back on-call ophthalmologist if he has severe worsening of syx prior to tomorrow, and that we could see him sooner. Pt expresses understanding and thanks.    Michael Lopez MD - PGY 2  Ophthalmology resident    "

## 2019-04-03 ENCOUNTER — OFFICE VISIT (OUTPATIENT)
Dept: OPHTHALMOLOGY | Facility: CLINIC | Age: 59
End: 2019-04-03
Attending: OPHTHALMOLOGY
Payer: MEDICARE

## 2019-04-03 ENCOUNTER — TRANSFERRED RECORDS (OUTPATIENT)
Dept: HEALTH INFORMATION MANAGEMENT | Facility: CLINIC | Age: 59
End: 2019-04-03

## 2019-04-03 ENCOUNTER — MEDICAL CORRESPONDENCE (OUTPATIENT)
Dept: HEALTH INFORMATION MANAGEMENT | Facility: CLINIC | Age: 59
End: 2019-04-03

## 2019-04-03 DIAGNOSIS — T85.612A ERODED CORNEAL SUTURE, INITIAL ENCOUNTER: Primary | ICD-10-CM

## 2019-04-03 PROCEDURE — G0463 HOSPITAL OUTPT CLINIC VISIT: HCPCS | Mod: ZF

## 2019-04-03 RX ORDER — POLYMYXIN B SULFATE AND TRIMETHOPRIM 1; 10000 MG/ML; [USP'U]/ML
1-2 SOLUTION OPHTHALMIC 3 TIMES DAILY
Qty: 1 BOTTLE | Refills: 0 | Status: SHIPPED | OUTPATIENT
Start: 2019-04-03

## 2019-04-03 ASSESSMENT — CONF VISUAL FIELD
OD_INFERIOR_TEMPORAL_RESTRICTION: 1
OS_INFERIOR_NASAL_RESTRICTION: 1
OS_SUPERIOR_NASAL_RESTRICTION: 1
OD_SUPERIOR_TEMPORAL_RESTRICTION: 1
OD_SUPERIOR_NASAL_RESTRICTION: 1
OS_INFERIOR_TEMPORAL_RESTRICTION: 1
OD_INFERIOR_NASAL_RESTRICTION: 1
OS_SUPERIOR_TEMPORAL_RESTRICTION: 1

## 2019-04-03 ASSESSMENT — VISUAL ACUITY
METHOD: SNELLEN - LINEAR
OD_SC: LP
OS_SC: LP

## 2019-04-03 ASSESSMENT — TONOMETRY
IOP_METHOD: ICARE
OS_IOP_MMHG: 17
OD_IOP_MMHG: 13

## 2019-04-03 ASSESSMENT — SLIT LAMP EXAM - LIDS
COMMENTS: NORMAL
COMMENTS: NORMAL

## 2019-04-03 ASSESSMENT — EXTERNAL EXAM - RIGHT EYE: OD_EXAM: NORMAL

## 2019-04-03 ASSESSMENT — EXTERNAL EXAM - LEFT EYE: OS_EXAM: NORMAL

## 2019-04-03 NOTE — PROGRESS NOTES
"CC -  Report some discomfort and Foreign body sensation left eye since monday    HPI -   ARGUS patient OS / Retinitis pigmentosa. Feels device helps a little, with reading & with navigation, but not enough to use routinely with ADLs.  Occasional discomfort, \"zaps\", not requiring meds.      FH: sister with advanced Retinitis pigmentosa (lives in [place]) and possible uncle with history of Retinitis pigmentosa   POH  ARGUS II OS 4/28/15  Anterior chamber & post segment washout 5/715 for NCVH and hyphema OS   Status post  YAG attempt at vitreolysis right eye  8/2015    Gtts: Genteal gel (occasional)    RETINAL IMAGING  Optical Coherence Tomography 09/05/18   left eye: implant in good position, outer retina atrophic changes stable  Right eye: outer retina atrophic changes    Fundus pic consistent with exam 09/05/18     Plan for OCTA both eyes     ASSESSMENT & PLAN  1. Cornea suture exposed left eye  -cornea suture from prior Cataract extraction intraocular lens; not from Argus II  Cornea suture removal:  1gtt proparacaine given  1gtt of betadine 5% administered  Cornea suture removed without complications  1gtt of betadine 5% administered    Plan:  polytrim drops four times a day  For 3 days    2. History of  Pars plana vitrectomy (PPV) right eye   - for vitreous floaters   - no eyedrops needed   - retina attached; looks good    3. ARGUS II OS    -  Pars plana vitrectomy (PPV)/Argus II 4/28/15   -  Anterior chamber & post segment washout/PPV left eye 5/7/15   -  retina attached, no infection, intraocular pressure good.    - external components look good and no exposure   - implant in good position   - observe    Has mild blepharospasm with the use of argus II for about 1/2 hour last a few min and stop  More noticeable with the setting of the background switch   No pain associated   Lately with increased blepharospasm also associated with increased intake of coffee   (+) history of smoking (less than 10 cigg/day, since " age 26)    Patient using the device about 3 times per weeks for 2-3 hours  OCTa attempted left eye- significant noise    Plan:  Recommend to decreased caffeine intake  Recommend to stop the use of argus II for a few minutes when blepharospasm occurs    2.  Dry eye syndrome - recommended using genteal 2-3/day     3.  Advanced Retinitis pigmentosa    4.  Pseudophakia both eyes     plan   Follow up as schedule for the Argus II- with Optical Coherence Tomography and optos   Patient interested in genetic testing- RPE65 done today- will follow up results  Recommend genetic counseling for Next gen sequencing    ~~~~~~~~~~~~~~~~~~~~~~~~~~~~~~~~~~   Complete documentation of historical and exam elements from today's encounter can be found in the full encounter summary report (not reduplicated in this progress note).  I personally obtained the chief complaint(s) and history of present illness.  I confirmed and edited as necessary the review of systems, past medical/surgical history, family history, social history, and examination findings as documented by others; and I examined the patient myself.  I personally reviewed the relevant tests, images, and reports as documented above.  I personally reviewed the ophthalmic test(s) associated with this encounter, agree with the interpretation(s) as documented by the resident/fellow, and have edited the corresponding report(s) as necessary.   I formulated and edited as necessary the assessment and plan and discussed the findings and management plan with the patient and family    Magdalene Adkins MD  .  Retina Service   Department of Ophthalmology and Visual Neurosciences   [facility]  Phone: [phone]   Fax: [phone]

## 2019-04-03 NOTE — NURSING NOTE
Chief Complaints and History of Present Illnesses   Patient presents with     Follow Up     Eye pain LE     Chief Complaint(s) and History of Present Illness(es)     Follow Up     Comments: Eye pain LE              Comments     Eye pain and tenderness to touch in LE for the past 5 days. Increased tearing and burning in LE. No redness.    Tangela UGARTE April 3, 2019 2:31 PM

## 2019-07-10 ENCOUNTER — OFFICE VISIT (OUTPATIENT)
Dept: OPHTHALMOLOGY | Facility: CLINIC | Age: 59
End: 2019-07-10
Attending: OPHTHALMOLOGY
Payer: MEDICARE

## 2019-07-10 DIAGNOSIS — H35.52 RP (RETINITIS PIGMENTOSA): Primary | ICD-10-CM

## 2019-07-10 DIAGNOSIS — H54.7 VISION LOSS: ICD-10-CM

## 2019-07-10 DIAGNOSIS — G89.18 POST-OP PAIN: ICD-10-CM

## 2019-07-10 DIAGNOSIS — Z48.810 AFTERCARE FOLLOWING SURGERY OF A SENSORY ORGAN: ICD-10-CM

## 2019-07-10 DIAGNOSIS — H35.52 PIGMENTARY RETINAL DYSTROPHY: ICD-10-CM

## 2019-07-10 DIAGNOSIS — Z71.83 ENCOUNTER FOR NONPROCREATIVE GENETIC COUNSELING: ICD-10-CM

## 2019-07-10 PROCEDURE — 40000803 ZZHCL STATISTIC DNA ISOL HIGH PURITY: Performed by: OPHTHALMOLOGY

## 2019-07-10 PROCEDURE — 96040 ZZH GENETIC COUNSELING, EACH 30 MINUTES: CPT | Mod: ZF | Performed by: GENETIC COUNSELOR, MS

## 2019-07-16 LAB — COPATH REPORT: NORMAL

## 2019-07-29 NOTE — PROGRESS NOTES
Presenting Information:  Froylan Edwards is a 59-year-old man with retinitis pigmentosa (RP).  He was referred for genetic counseling and testing today by his ophthalmologist Dr. Magdalene Adkins.  During our visit today I collected a personal and family history, discussed possible genetic contributions to his history of RP, and obtained written informed consent for genetic testing.    Family History:  A detailed pedigree was obtained and scanned into the electronic medical record.  It is significant for the following:     Froylan's vision loss was diagnosed around age 6-7, and confirmed to be due to retinitis pigmentosa.  Froylan additionally has a diagnosis of type 2 diabetes and has a history of a  bacterial liver abscess.    Froylan has three daughters and three sons, all of whom are healthy and have normal vision.    Froylan has one sister who also has retinitis pigmentosa, diagnosed at age 18.  She additionally has type 2 diabetes and had a valve replacement.      Froylan's mother  at 81 due to a bacterial liver abscess.      Froylan's father  at 79 of dementia, which was possibly secondary to a fall.  This man is not believed to have had any vision loss.      Froylan had a paternal uncle who also had retinitis pigmentosa diagnosed in childhood.      Froylan's paternal grandfather reportedly had night blindness.      Froylan is of [place] ancestry on his maternal side and Swazi and Persian ancestry on his paternal side.  Consanguinity was denied.       Discussion:  I reviewed with Froylan that genes are long stretches of DNA that are responsible for how our bodies look and how our bodies work.  We all have two copies of most genes; one inherited from the mother and one inherited from the father.  When there is a change, called a mutation, in the DNA sequence of a gene it can cause the signs and symptoms of a genetic condition.       There are a number of genes that have been associated with retinitis  pigmentosa.  Depending on the genetic cause, RP can be inherited in an autosomal recessive, dominant, or X-linked manner. These genes can cause non-syndromic RP, meaning no other symptoms aside from the eye changes are present, or can by syndromic, meaning other symptoms, such as hearing loss, kidney problems, or birth defects can be present.  Froylan previously underwent genetic testing for one gene, RPE65, gene through [employer], which returned negative (normal).      Identifying the underlying cause for Froylan's RP is medically necessary for several reasons.  First, this may provide a genetic explanation for Froylan's disease, and may alter medical management if a syndromic cause of his vision loss is identified, or if a mutation were found for a gene which has gene therapy treatment options available. There are gene therapy clinical trials currently underway and planned for numerous genetic eye diseases; participation in these studies will require knowledge of Froylan's underlying genetic disease.. If a genetic explanation for disease is identified, it will also allow us to test other family members who may be at risk for the condition.     We reviewed that results could come back positive, negative, or with a variant of uncertain significance (VUS). Froylan provided written informed consent for genetic testing.  We will plan to complete testing at the [facility].  On Froylan's behalf we will attempt submit a prior authorization for genetic testing.  Once approved, we will contact Froylan again.       It was a pleasure meeting with Froylan today.  My contact information was shared should he have additional questions or concerns.       Plan:  1.  We will attempt to submit a prior authorization for genetic testing   2.  Once approved, genetic testing for a panel of genes associated with retinitis pigmentosa (RP).  3.  Follow-up as determined by results of the the  above testing      Bhumika Allison [facility]  Genetic Counselor  Division of Genetics and Metabolism    Total time spent in consultation with the family was approximately 30 minutes    Cc: No letter

## 2019-07-31 ENCOUNTER — TELEPHONE (OUTPATIENT)
Dept: OPHTHALMOLOGY | Facility: CLINIC | Age: 59
End: 2019-07-31

## 2019-08-07 DIAGNOSIS — H54.7 VISION LOSS: ICD-10-CM

## 2019-08-07 DIAGNOSIS — H35.52 RP (RETINITIS PIGMENTOSA): ICD-10-CM

## 2019-08-07 DIAGNOSIS — H35.52 PIGMENTARY RETINAL DYSTROPHY: Primary | ICD-10-CM

## 2019-08-22 ENCOUNTER — TRANSFERRED RECORDS (OUTPATIENT)
Dept: HEALTH INFORMATION MANAGEMENT | Facility: CLINIC | Age: 59
End: 2019-08-22

## 2019-09-12 ENCOUNTER — TELEPHONE (OUTPATIENT)
Dept: OPHTHALMOLOGY | Facility: CLINIC | Age: 59
End: 2019-09-12

## 2019-09-12 NOTE — TELEPHONE ENCOUNTER
"I contacted Froylan \"Yoan\" to discuss the results of his recent genetic testing.  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  After review by Dr Adkins, she believes this does fit Froylan's specific phenotype.   Per Dr. Adkins there is not currently any gene therapy in the works for this gene, but that can change in the future.  Of note, in the literature there is at least one heterozygous mutation in the RDH12 gene that has also been identified in dominantly inherited later-onset RP, which may put his children and other family members at risk.  Froylan was additionally found to have several variants of uncertain significance, most in genes associated with autosomal recessive conditions.  One exception is a variant in the [id number] gene associated with X-linked cone-margoth dystrophy and night blindness.   Yoan expressed understanding and had no additional question at this time.  A results letter with a copy of his lab report will be sent to him by mail.  I remain available for additional questions or concerns.       Bhumika Allison MS [facility]  Genetic Counselor  Division of Genetics and Metabolism    "

## 2020-09-26 ENCOUNTER — NURSE TRIAGE (OUTPATIENT)
Dept: NURSING | Facility: CLINIC | Age: 60
End: 2020-09-26

## 2020-09-26 NOTE — TELEPHONE ENCOUNTER
"Patient states that his eye tooth on the right side of his mouth is split from left to right.Pain is constant and varies= from \"3=10\". Appointment with DDS scheduled for Tuesday. Tooth broke yesterday. Pain is worse today.   He has been taking Naprosyn for the pain, Tylenol. (He uses Naprosyn for migraines). It has been helping.   Given [facility] information. (Discussed option of ER if needed for pain management).     Reason for Disposition    Toothache present > 24 hours    Additional Information    Negative: Shock suspected (e.g., cold/pale/clammy skin, too weak to stand, low BP, rapid pulse)    Negative: [1] Similar pain previously AND [2] it was from \"heart attack\"    Negative: [1] Similar pain previously AND [2] it was from \"angina\" AND [3] not relieved by nitroglycerin    Negative: Sounds like a life-threatening emergency to the triager    Negative: Chest pain    Negative: Toothache followed tooth injury    Negative: Toothache or mouth pain after tooth extraction    Negative: Canker sore (i.e., aphthous ulcer)    Negative: Tongue is very swollen and tender    Negative: [1] Face is swollen AND [2] fever    Negative: Patient sounds very sick or weak to the triager    Negative: [1] SEVERE pain (e.g., excruciating, unable to do any normal activities) AND [2] not improved 2 hours after pain medicine    Negative: Face is very swollen    Negative: Fever    Negative: [1] Face is swollen AND [2] no fever    Protocols used: TOOTHACHE-A-AH      "

## 2024-05-09 ENCOUNTER — LAB REQUISITION (OUTPATIENT)
Dept: LAB | Facility: CLINIC | Age: 64
End: 2024-05-09

## 2024-05-09 DIAGNOSIS — E11.9 TYPE 2 DIABETES MELLITUS WITHOUT COMPLICATIONS (H): ICD-10-CM

## 2024-05-09 LAB
ALBUMIN UR-MCNC: NEGATIVE MG/DL
APPEARANCE UR: CLEAR
BILIRUB UR QL STRIP: NEGATIVE
COLOR UR AUTO: ABNORMAL
GLUCOSE UR STRIP-MCNC: >=1000 MG/DL
HGB UR QL STRIP: NEGATIVE
KETONES UR STRIP-MCNC: NEGATIVE MG/DL
LEUKOCYTE ESTERASE UR QL STRIP: NEGATIVE
NITRATE UR QL: NEGATIVE
PH UR STRIP: 5.5 [PH] (ref 5–7)
RBC URINE: <1 /HPF
SP GR UR STRIP: 1.03 (ref 1–1.03)
SQUAMOUS EPITHELIAL: <1 /HPF
UROBILINOGEN UR STRIP-MCNC: NORMAL MG/DL
WBC URINE: <1 /HPF

## 2024-05-09 PROCEDURE — 81001 URINALYSIS AUTO W/SCOPE: CPT | Performed by: NURSE PRACTITIONER

## (undated) DEVICE — ESU CORD BIPOLAR GREEN 10-4000

## (undated) DEVICE — DRAPE MICRO 41X81"

## (undated) DEVICE — GLOVE PROTEXIS MICRO 6.0  2D73PM60

## (undated) DEVICE — TAPE MICROPORE 2"X1.5YD 1530S-2

## (undated) DEVICE — NDL 18GA 1.5" 305196

## (undated) DEVICE — TUBING SUCTION 12"X1/4" N612

## (undated) DEVICE — EYE PERFLUORON KIT 5ML 8065900163

## (undated) DEVICE — SYR 01ML 27GA 0.5" ECLIPSE 305789

## (undated) DEVICE — EYE NDL RETROBULBAR 25GA 1.5" 581275

## (undated) DEVICE — PACK VITRECTOMY PQ15VI57E

## (undated) DEVICE — GLOVE PROTEXIS MICRO 7.0  2D73PM70

## (undated) DEVICE — EYE SOL BSS 500ML BAG 0065-1795-04

## (undated) DEVICE — TAPE MICROPORE 1"X1.5YD 1530S-1

## (undated) DEVICE — LINEN TOWEL PACK X5 5464

## (undated) DEVICE — EYE PACK CONSTELLATION VITRECTOMY TOTAL PLUS 25GA 8065751462

## (undated) DEVICE — EYE SHIELD PLASTIC

## (undated) RX ORDER — GABAPENTIN 300 MG/1
CAPSULE ORAL
Status: DISPENSED
Start: 2018-09-18

## (undated) RX ORDER — ONDANSETRON 2 MG/ML
INJECTION INTRAMUSCULAR; INTRAVENOUS
Status: DISPENSED
Start: 2018-09-18

## (undated) RX ORDER — FENTANYL CITRATE 50 UG/ML
INJECTION, SOLUTION INTRAMUSCULAR; INTRAVENOUS
Status: DISPENSED
Start: 2018-09-18

## (undated) RX ORDER — DEXAMETHASONE SODIUM PHOSPHATE 4 MG/ML
INJECTION, SOLUTION INTRA-ARTICULAR; INTRALESIONAL; INTRAMUSCULAR; INTRAVENOUS; SOFT TISSUE
Status: DISPENSED
Start: 2018-09-18

## (undated) RX ORDER — PROPOFOL 10 MG/ML
INJECTION, EMULSION INTRAVENOUS
Status: DISPENSED
Start: 2018-09-18

## (undated) RX ORDER — LIDOCAINE HYDROCHLORIDE 20 MG/ML
INJECTION, SOLUTION EPIDURAL; INFILTRATION; INTRACAUDAL; PERINEURAL
Status: DISPENSED
Start: 2018-09-18

## (undated) RX ORDER — ACETAMINOPHEN 325 MG/1
TABLET ORAL
Status: DISPENSED
Start: 2018-09-18